# Patient Record
Sex: MALE | Race: WHITE | NOT HISPANIC OR LATINO | URBAN - METROPOLITAN AREA
[De-identification: names, ages, dates, MRNs, and addresses within clinical notes are randomized per-mention and may not be internally consistent; named-entity substitution may affect disease eponyms.]

---

## 2018-04-30 ENCOUNTER — AMBULATORY - HEALTHEAST (OUTPATIENT)
Dept: ADMINISTRATIVE | Facility: CLINIC | Age: 78
End: 2018-04-30

## 2018-04-30 ENCOUNTER — OFFICE VISIT - HEALTHEAST (OUTPATIENT)
Dept: GERIATRICS | Facility: CLINIC | Age: 78
End: 2018-04-30

## 2018-04-30 DIAGNOSIS — Z96.619 S/P REVERSE TOTAL SHOULDER ARTHROPLASTY: ICD-10-CM

## 2018-04-30 DIAGNOSIS — E03.9 HYPOTHYROIDISM: ICD-10-CM

## 2018-04-30 DIAGNOSIS — I10 HTN (HYPERTENSION): ICD-10-CM

## 2018-04-30 RX ORDER — ATENOLOL 25 MG/1
25 TABLET ORAL DAILY
Status: SHIPPED | COMMUNITY
Start: 2018-04-30

## 2018-04-30 RX ORDER — LEVOTHYROXINE SODIUM 150 UG/1
150 TABLET ORAL DAILY
Status: SHIPPED | COMMUNITY
Start: 2018-04-30

## 2018-04-30 RX ORDER — ATORVASTATIN CALCIUM 80 MG/1
80 TABLET, FILM COATED ORAL AT BEDTIME
Status: SHIPPED | COMMUNITY
Start: 2018-04-30

## 2018-04-30 RX ORDER — AMOXICILLIN 250 MG
1 CAPSULE ORAL DAILY
Status: SHIPPED | COMMUNITY
Start: 2018-04-30

## 2018-05-02 ENCOUNTER — AMBULATORY - HEALTHEAST (OUTPATIENT)
Dept: GERIATRICS | Facility: CLINIC | Age: 78
End: 2018-05-02

## 2018-11-07 ENCOUNTER — OFFICE VISIT - HEALTHEAST (OUTPATIENT)
Dept: GERIATRICS | Facility: CLINIC | Age: 78
End: 2018-11-07

## 2018-11-07 DIAGNOSIS — I10 ESSENTIAL HYPERTENSION: ICD-10-CM

## 2018-11-07 DIAGNOSIS — R29.6 FREQUENT FALLS: ICD-10-CM

## 2018-11-07 DIAGNOSIS — R52 PAIN MANAGEMENT: ICD-10-CM

## 2018-11-07 DIAGNOSIS — R53.81 PHYSICAL DECONDITIONING: ICD-10-CM

## 2018-11-07 DIAGNOSIS — C61 PROSTATE CANCER METASTATIC TO BONE (H): ICD-10-CM

## 2018-11-07 DIAGNOSIS — M17.0 OSTEOARTHRITIS OF BOTH KNEES, UNSPECIFIED OSTEOARTHRITIS TYPE: ICD-10-CM

## 2018-11-07 DIAGNOSIS — T07.XXXA ABRASIONS OF MULTIPLE SITES: ICD-10-CM

## 2018-11-07 DIAGNOSIS — C79.51 PROSTATE CANCER METASTATIC TO BONE (H): ICD-10-CM

## 2018-11-07 DIAGNOSIS — M19.012 OSTEOARTHRITIS OF LEFT SHOULDER, UNSPECIFIED OSTEOARTHRITIS TYPE: ICD-10-CM

## 2018-11-08 ENCOUNTER — AMBULATORY - HEALTHEAST (OUTPATIENT)
Dept: ADMINISTRATIVE | Facility: CLINIC | Age: 78
End: 2018-11-08

## 2018-11-08 RX ORDER — ACETAMINOPHEN 500 MG
1000 TABLET ORAL 3 TIMES DAILY
Status: SHIPPED | COMMUNITY
Start: 2018-11-08

## 2018-11-08 RX ORDER — BICALUTAMIDE 50 MG/1
50 TABLET, FILM COATED ORAL DAILY
Status: SHIPPED | COMMUNITY
Start: 2018-11-08

## 2018-11-08 RX ORDER — LOSARTAN POTASSIUM 25 MG/1
25 TABLET ORAL DAILY
Status: SHIPPED | COMMUNITY
Start: 2018-11-08

## 2018-11-09 ENCOUNTER — RECORDS - HEALTHEAST (OUTPATIENT)
Dept: LAB | Facility: CLINIC | Age: 78
End: 2018-11-09

## 2018-11-09 ENCOUNTER — OFFICE VISIT - HEALTHEAST (OUTPATIENT)
Dept: GERIATRICS | Facility: CLINIC | Age: 78
End: 2018-11-09

## 2018-11-09 DIAGNOSIS — T07.XXXA ABRASIONS OF MULTIPLE SITES: ICD-10-CM

## 2018-11-09 DIAGNOSIS — C79.51 PROSTATE CANCER METASTATIC TO BONE (H): ICD-10-CM

## 2018-11-09 DIAGNOSIS — R53.81 PHYSICAL DECONDITIONING: ICD-10-CM

## 2018-11-09 DIAGNOSIS — I10 ESSENTIAL HYPERTENSION: ICD-10-CM

## 2018-11-09 DIAGNOSIS — C61 PROSTATE CANCER METASTATIC TO BONE (H): ICD-10-CM

## 2018-11-09 DIAGNOSIS — R29.6 FREQUENT FALLS: ICD-10-CM

## 2018-11-10 ENCOUNTER — OFFICE VISIT - HEALTHEAST (OUTPATIENT)
Dept: GERIATRICS | Facility: CLINIC | Age: 78
End: 2018-11-10

## 2018-11-10 DIAGNOSIS — C79.51 PROSTATE CANCER METASTATIC TO BONE (H): ICD-10-CM

## 2018-11-10 DIAGNOSIS — T07.XXXA ABRASIONS OF MULTIPLE SITES: ICD-10-CM

## 2018-11-10 DIAGNOSIS — R29.6 FREQUENT FALLS: ICD-10-CM

## 2018-11-10 DIAGNOSIS — C61 PROSTATE CANCER METASTATIC TO BONE (H): ICD-10-CM

## 2018-11-10 DIAGNOSIS — R53.81 PHYSICAL DECONDITIONING: ICD-10-CM

## 2018-11-12 ENCOUNTER — OFFICE VISIT - HEALTHEAST (OUTPATIENT)
Dept: GERIATRICS | Facility: CLINIC | Age: 78
End: 2018-11-12

## 2018-11-12 DIAGNOSIS — C61 PROSTATE CANCER METASTATIC TO BONE (H): ICD-10-CM

## 2018-11-12 DIAGNOSIS — I10 ESSENTIAL HYPERTENSION: ICD-10-CM

## 2018-11-12 DIAGNOSIS — C79.51 PROSTATE CANCER METASTATIC TO BONE (H): ICD-10-CM

## 2018-11-12 DIAGNOSIS — T07.XXXA ABRASIONS OF MULTIPLE SITES: ICD-10-CM

## 2018-11-12 DIAGNOSIS — R53.81 PHYSICAL DECONDITIONING: ICD-10-CM

## 2018-11-12 LAB
ANION GAP SERPL CALCULATED.3IONS-SCNC: 8 MMOL/L (ref 5–18)
BUN SERPL-MCNC: 20 MG/DL (ref 8–28)
CALCIUM SERPL-MCNC: 9.4 MG/DL (ref 8.5–10.5)
CHLORIDE BLD-SCNC: 100 MMOL/L (ref 98–107)
CK SERPL-CCNC: 110 U/L (ref 30–190)
CO2 SERPL-SCNC: 30 MMOL/L (ref 22–31)
CREAT SERPL-MCNC: 0.88 MG/DL (ref 0.7–1.3)
ERYTHROCYTE [DISTWIDTH] IN BLOOD BY AUTOMATED COUNT: 13 % (ref 11–14.5)
GFR SERPL CREATININE-BSD FRML MDRD: >60 ML/MIN/1.73M2
GLUCOSE BLD-MCNC: 94 MG/DL (ref 70–125)
HCT VFR BLD AUTO: 33.6 % (ref 40–54)
HGB BLD-MCNC: 10.5 G/DL (ref 14–18)
MCH RBC QN AUTO: 28.8 PG (ref 27–34)
MCHC RBC AUTO-ENTMCNC: 31.3 G/DL (ref 32–36)
MCV RBC AUTO: 92 FL (ref 80–100)
PLATELET # BLD AUTO: 295 THOU/UL (ref 140–440)
PMV BLD AUTO: 10 FL (ref 8.5–12.5)
POTASSIUM BLD-SCNC: 3 MMOL/L (ref 3.5–5)
RBC # BLD AUTO: 3.64 MILL/UL (ref 4.4–6.2)
SODIUM SERPL-SCNC: 138 MMOL/L (ref 136–145)
WBC: 9.1 THOU/UL (ref 4–11)

## 2018-11-14 ENCOUNTER — OFFICE VISIT - HEALTHEAST (OUTPATIENT)
Dept: GERIATRICS | Facility: CLINIC | Age: 78
End: 2018-11-14

## 2018-11-14 DIAGNOSIS — R29.6 FREQUENT FALLS: ICD-10-CM

## 2018-11-14 DIAGNOSIS — M19.012 OSTEOARTHRITIS OF LEFT SHOULDER, UNSPECIFIED OSTEOARTHRITIS TYPE: ICD-10-CM

## 2018-11-14 DIAGNOSIS — C79.51 PROSTATE CANCER METASTATIC TO BONE (H): ICD-10-CM

## 2018-11-14 DIAGNOSIS — R52 PAIN MANAGEMENT: ICD-10-CM

## 2018-11-14 DIAGNOSIS — M17.0 OSTEOARTHRITIS OF BOTH KNEES, UNSPECIFIED OSTEOARTHRITIS TYPE: ICD-10-CM

## 2018-11-14 DIAGNOSIS — R53.81 PHYSICAL DECONDITIONING: ICD-10-CM

## 2018-11-14 DIAGNOSIS — I10 ESSENTIAL HYPERTENSION: ICD-10-CM

## 2018-11-14 DIAGNOSIS — C61 PROSTATE CANCER METASTATIC TO BONE (H): ICD-10-CM

## 2018-11-14 DIAGNOSIS — K59.00 CONSTIPATION, UNSPECIFIED CONSTIPATION TYPE: ICD-10-CM

## 2018-11-14 DIAGNOSIS — G47.00 INSOMNIA: ICD-10-CM

## 2018-11-14 DIAGNOSIS — T07.XXXA ABRASIONS OF MULTIPLE SITES: ICD-10-CM

## 2018-11-15 ENCOUNTER — RECORDS - HEALTHEAST (OUTPATIENT)
Dept: LAB | Facility: CLINIC | Age: 78
End: 2018-11-15

## 2018-11-16 ENCOUNTER — OFFICE VISIT - HEALTHEAST (OUTPATIENT)
Dept: GERIATRICS | Facility: CLINIC | Age: 78
End: 2018-11-16

## 2018-11-16 DIAGNOSIS — R53.81 PHYSICAL DECONDITIONING: ICD-10-CM

## 2018-11-16 DIAGNOSIS — C61 PROSTATE CANCER METASTATIC TO BONE (H): ICD-10-CM

## 2018-11-16 DIAGNOSIS — T07.XXXA ABRASIONS OF MULTIPLE SITES: ICD-10-CM

## 2018-11-16 DIAGNOSIS — M17.0 OSTEOARTHRITIS OF BOTH KNEES, UNSPECIFIED OSTEOARTHRITIS TYPE: ICD-10-CM

## 2018-11-16 DIAGNOSIS — C79.51 PROSTATE CANCER METASTATIC TO BONE (H): ICD-10-CM

## 2018-11-16 LAB — POTASSIUM BLD-SCNC: 3.9 MMOL/L (ref 3.5–5)

## 2018-11-19 ENCOUNTER — OFFICE VISIT - HEALTHEAST (OUTPATIENT)
Dept: GERIATRICS | Facility: CLINIC | Age: 78
End: 2018-11-19

## 2018-11-19 DIAGNOSIS — I10 ESSENTIAL HYPERTENSION: ICD-10-CM

## 2018-11-19 DIAGNOSIS — T07.XXXA ABRASIONS OF MULTIPLE SITES: ICD-10-CM

## 2018-11-19 DIAGNOSIS — R53.81 PHYSICAL DECONDITIONING: ICD-10-CM

## 2018-11-19 DIAGNOSIS — R29.6 FREQUENT FALLS: ICD-10-CM

## 2018-11-21 ENCOUNTER — OFFICE VISIT - HEALTHEAST (OUTPATIENT)
Dept: GERIATRICS | Facility: CLINIC | Age: 78
End: 2018-11-21

## 2018-11-21 DIAGNOSIS — K59.00 CONSTIPATION, UNSPECIFIED CONSTIPATION TYPE: ICD-10-CM

## 2018-11-21 DIAGNOSIS — G47.00 INSOMNIA: ICD-10-CM

## 2018-11-21 DIAGNOSIS — I10 ESSENTIAL HYPERTENSION: ICD-10-CM

## 2018-11-21 DIAGNOSIS — R53.81 PHYSICAL DECONDITIONING: ICD-10-CM

## 2018-11-21 DIAGNOSIS — C79.51 PROSTATE CANCER METASTATIC TO BONE (H): ICD-10-CM

## 2018-11-21 DIAGNOSIS — M17.0 OSTEOARTHRITIS OF BOTH KNEES, UNSPECIFIED OSTEOARTHRITIS TYPE: ICD-10-CM

## 2018-11-21 DIAGNOSIS — T07.XXXA ABRASIONS OF MULTIPLE SITES: ICD-10-CM

## 2018-11-21 DIAGNOSIS — C61 PROSTATE CANCER METASTATIC TO BONE (H): ICD-10-CM

## 2018-11-21 DIAGNOSIS — M19.012 OSTEOARTHRITIS OF LEFT SHOULDER, UNSPECIFIED OSTEOARTHRITIS TYPE: ICD-10-CM

## 2018-11-23 ENCOUNTER — OFFICE VISIT - HEALTHEAST (OUTPATIENT)
Dept: GERIATRICS | Facility: CLINIC | Age: 78
End: 2018-11-23

## 2018-11-23 DIAGNOSIS — G93.40 ENCEPHALOPATHY: ICD-10-CM

## 2018-11-23 DIAGNOSIS — I10 ESSENTIAL HYPERTENSION: ICD-10-CM

## 2018-11-23 DIAGNOSIS — R53.81 PHYSICAL DECONDITIONING: ICD-10-CM

## 2018-12-07 ENCOUNTER — OFFICE VISIT - HEALTHEAST (OUTPATIENT)
Dept: GERIATRICS | Facility: CLINIC | Age: 78
End: 2018-12-07

## 2018-12-07 ENCOUNTER — AMBULATORY - HEALTHEAST (OUTPATIENT)
Dept: ADMINISTRATIVE | Facility: CLINIC | Age: 78
End: 2018-12-07

## 2018-12-07 DIAGNOSIS — M17.0 OSTEOARTHRITIS OF BOTH KNEES, UNSPECIFIED OSTEOARTHRITIS TYPE: ICD-10-CM

## 2018-12-07 DIAGNOSIS — I10 ESSENTIAL HYPERTENSION: ICD-10-CM

## 2018-12-07 DIAGNOSIS — C61 PROSTATE CANCER METASTATIC TO BONE (H): ICD-10-CM

## 2018-12-07 DIAGNOSIS — C79.51 PROSTATE CANCER METASTATIC TO BONE (H): ICD-10-CM

## 2018-12-07 DIAGNOSIS — T07.XXXA ABRASIONS OF MULTIPLE SITES: ICD-10-CM

## 2018-12-07 RX ORDER — LANSOPRAZOLE 30 MG/1
30 CAPSULE, DELAYED RELEASE ORAL 2 TIMES DAILY
Status: SHIPPED | COMMUNITY
Start: 2018-12-07

## 2018-12-07 RX ORDER — OXYCODONE HYDROCHLORIDE 5 MG/1
5 TABLET ORAL EVERY 8 HOURS PRN
Status: SHIPPED | COMMUNITY
Start: 2018-12-07

## 2018-12-10 ENCOUNTER — OFFICE VISIT - HEALTHEAST (OUTPATIENT)
Dept: GERIATRICS | Facility: CLINIC | Age: 78
End: 2018-12-10

## 2018-12-10 DIAGNOSIS — M17.0 OSTEOARTHRITIS OF BOTH KNEES, UNSPECIFIED OSTEOARTHRITIS TYPE: ICD-10-CM

## 2018-12-10 DIAGNOSIS — C61 PROSTATE CANCER METASTATIC TO BONE (H): ICD-10-CM

## 2018-12-10 DIAGNOSIS — C79.51 PROSTATE CANCER METASTATIC TO BONE (H): ICD-10-CM

## 2018-12-10 DIAGNOSIS — G93.40 ENCEPHALOPATHY: ICD-10-CM

## 2018-12-14 ENCOUNTER — OFFICE VISIT - HEALTHEAST (OUTPATIENT)
Dept: GERIATRICS | Facility: CLINIC | Age: 78
End: 2018-12-14

## 2018-12-14 DIAGNOSIS — M17.0 OSTEOARTHRITIS OF BOTH KNEES, UNSPECIFIED OSTEOARTHRITIS TYPE: ICD-10-CM

## 2018-12-14 DIAGNOSIS — C61 PROSTATE CANCER METASTATIC TO BONE (H): ICD-10-CM

## 2018-12-14 DIAGNOSIS — C79.51 PROSTATE CANCER METASTATIC TO BONE (H): ICD-10-CM

## 2018-12-14 DIAGNOSIS — G93.40 ENCEPHALOPATHY: ICD-10-CM

## 2018-12-17 ENCOUNTER — OFFICE VISIT - HEALTHEAST (OUTPATIENT)
Dept: GERIATRICS | Facility: CLINIC | Age: 78
End: 2018-12-17

## 2018-12-17 DIAGNOSIS — C79.51 PROSTATE CANCER METASTATIC TO BONE (H): ICD-10-CM

## 2018-12-17 DIAGNOSIS — I10 ESSENTIAL HYPERTENSION: ICD-10-CM

## 2018-12-17 DIAGNOSIS — C61 PROSTATE CANCER METASTATIC TO BONE (H): ICD-10-CM

## 2018-12-17 DIAGNOSIS — M17.0 OSTEOARTHRITIS OF BOTH KNEES, UNSPECIFIED OSTEOARTHRITIS TYPE: ICD-10-CM

## 2018-12-19 ENCOUNTER — OFFICE VISIT - HEALTHEAST (OUTPATIENT)
Dept: GERIATRICS | Facility: CLINIC | Age: 78
End: 2018-12-19

## 2018-12-19 DIAGNOSIS — K59.03 DRUG-INDUCED CONSTIPATION: ICD-10-CM

## 2018-12-19 DIAGNOSIS — R10.84 GENERALIZED ABDOMINAL PAIN: ICD-10-CM

## 2018-12-21 ENCOUNTER — OFFICE VISIT - HEALTHEAST (OUTPATIENT)
Dept: GERIATRICS | Facility: CLINIC | Age: 78
End: 2018-12-21

## 2018-12-21 DIAGNOSIS — I10 ESSENTIAL HYPERTENSION: ICD-10-CM

## 2018-12-21 DIAGNOSIS — R53.81 PHYSICAL DECONDITIONING: ICD-10-CM

## 2018-12-21 DIAGNOSIS — C61 PROSTATE CANCER METASTATIC TO BONE (H): ICD-10-CM

## 2018-12-21 DIAGNOSIS — C79.51 PROSTATE CANCER METASTATIC TO BONE (H): ICD-10-CM

## 2018-12-24 ENCOUNTER — OFFICE VISIT - HEALTHEAST (OUTPATIENT)
Dept: GERIATRICS | Facility: CLINIC | Age: 78
End: 2018-12-24

## 2018-12-24 DIAGNOSIS — R53.81 PHYSICAL DECONDITIONING: ICD-10-CM

## 2018-12-24 DIAGNOSIS — I10 ESSENTIAL HYPERTENSION: ICD-10-CM

## 2018-12-24 DIAGNOSIS — C79.51 PROSTATE CANCER METASTATIC TO BONE (H): ICD-10-CM

## 2018-12-24 DIAGNOSIS — C61 PROSTATE CANCER METASTATIC TO BONE (H): ICD-10-CM

## 2018-12-26 ENCOUNTER — OFFICE VISIT - HEALTHEAST (OUTPATIENT)
Dept: GERIATRICS | Facility: CLINIC | Age: 78
End: 2018-12-26

## 2018-12-26 DIAGNOSIS — R53.81 PHYSICAL DECONDITIONING: ICD-10-CM

## 2018-12-26 DIAGNOSIS — I10 HYPERTENSION, UNSPECIFIED TYPE: ICD-10-CM

## 2018-12-26 DIAGNOSIS — C80.1: ICD-10-CM

## 2018-12-26 DIAGNOSIS — K59.03 DRUG-INDUCED CONSTIPATION: ICD-10-CM

## 2018-12-26 DIAGNOSIS — C79.31: ICD-10-CM

## 2018-12-28 ENCOUNTER — OFFICE VISIT - HEALTHEAST (OUTPATIENT)
Dept: GERIATRICS | Facility: CLINIC | Age: 78
End: 2018-12-28

## 2018-12-28 DIAGNOSIS — C79.31: ICD-10-CM

## 2018-12-28 DIAGNOSIS — C79.51 PROSTATE CANCER METASTATIC TO BONE (H): ICD-10-CM

## 2018-12-28 DIAGNOSIS — I10 HYPERTENSION, UNSPECIFIED TYPE: ICD-10-CM

## 2018-12-28 DIAGNOSIS — C61 PROSTATE CANCER METASTATIC TO BONE (H): ICD-10-CM

## 2018-12-28 DIAGNOSIS — C80.1: ICD-10-CM

## 2018-12-31 ENCOUNTER — RECORDS - HEALTHEAST (OUTPATIENT)
Dept: LAB | Facility: CLINIC | Age: 78
End: 2018-12-31

## 2018-12-31 ENCOUNTER — OFFICE VISIT - HEALTHEAST (OUTPATIENT)
Dept: GERIATRICS | Facility: CLINIC | Age: 78
End: 2018-12-31

## 2018-12-31 DIAGNOSIS — C79.51 PROSTATE CANCER METASTATIC TO BONE (H): ICD-10-CM

## 2018-12-31 DIAGNOSIS — C79.31: ICD-10-CM

## 2018-12-31 DIAGNOSIS — I10 HYPERTENSION, UNSPECIFIED TYPE: ICD-10-CM

## 2018-12-31 DIAGNOSIS — C61 PROSTATE CANCER METASTATIC TO BONE (H): ICD-10-CM

## 2018-12-31 DIAGNOSIS — C80.1: ICD-10-CM

## 2019-01-01 LAB — BACTERIA SPEC CULT: NORMAL

## 2019-01-04 ENCOUNTER — OFFICE VISIT - HEALTHEAST (OUTPATIENT)
Dept: GERIATRICS | Facility: CLINIC | Age: 79
End: 2019-01-04

## 2019-01-04 DIAGNOSIS — R53.81 PHYSICAL DECONDITIONING: ICD-10-CM

## 2019-01-04 DIAGNOSIS — C79.31: ICD-10-CM

## 2019-01-04 DIAGNOSIS — I10 HYPERTENSION, UNSPECIFIED TYPE: ICD-10-CM

## 2019-01-04 DIAGNOSIS — C80.1: ICD-10-CM

## 2019-01-07 ENCOUNTER — OFFICE VISIT - HEALTHEAST (OUTPATIENT)
Dept: GERIATRICS | Facility: CLINIC | Age: 79
End: 2019-01-07

## 2019-01-07 DIAGNOSIS — C61 PROSTATE CANCER METASTATIC TO BONE (H): ICD-10-CM

## 2019-01-07 DIAGNOSIS — I10 ESSENTIAL HYPERTENSION: ICD-10-CM

## 2019-01-07 DIAGNOSIS — R53.81 PHYSICAL DECONDITIONING: ICD-10-CM

## 2019-01-07 DIAGNOSIS — C79.51 PROSTATE CANCER METASTATIC TO BONE (H): ICD-10-CM

## 2019-01-11 ENCOUNTER — OFFICE VISIT - HEALTHEAST (OUTPATIENT)
Dept: GERIATRICS | Facility: CLINIC | Age: 79
End: 2019-01-11

## 2019-01-11 DIAGNOSIS — I10 HYPERTENSION, UNSPECIFIED TYPE: ICD-10-CM

## 2019-01-11 DIAGNOSIS — K59.00 CONSTIPATION, UNSPECIFIED CONSTIPATION TYPE: ICD-10-CM

## 2019-01-11 DIAGNOSIS — C80.1: ICD-10-CM

## 2019-01-11 DIAGNOSIS — C79.31: ICD-10-CM

## 2019-01-14 ENCOUNTER — OFFICE VISIT - HEALTHEAST (OUTPATIENT)
Dept: GERIATRICS | Facility: CLINIC | Age: 79
End: 2019-01-14

## 2019-01-14 DIAGNOSIS — I10 HYPERTENSION, UNSPECIFIED TYPE: ICD-10-CM

## 2019-01-14 DIAGNOSIS — R53.81 PHYSICAL DECONDITIONING: ICD-10-CM

## 2019-01-14 DIAGNOSIS — C79.51 PROSTATE CANCER METASTATIC TO BONE (H): ICD-10-CM

## 2019-01-14 DIAGNOSIS — C61 PROSTATE CANCER METASTATIC TO BONE (H): ICD-10-CM

## 2019-01-18 ENCOUNTER — OFFICE VISIT - HEALTHEAST (OUTPATIENT)
Dept: GERIATRICS | Facility: CLINIC | Age: 79
End: 2019-01-18

## 2019-01-18 DIAGNOSIS — C61 PROSTATE CANCER METASTATIC TO BONE (H): ICD-10-CM

## 2019-01-18 DIAGNOSIS — C79.51 PROSTATE CANCER METASTATIC TO BONE (H): ICD-10-CM

## 2019-01-18 DIAGNOSIS — C79.31: ICD-10-CM

## 2019-01-18 DIAGNOSIS — C80.1: ICD-10-CM

## 2019-01-18 DIAGNOSIS — I10 HYPERTENSION, UNSPECIFIED TYPE: ICD-10-CM

## 2019-01-18 DIAGNOSIS — F03.90 DEMENTIA WITHOUT BEHAVIORAL DISTURBANCE, UNSPECIFIED DEMENTIA TYPE: ICD-10-CM

## 2019-01-21 ENCOUNTER — OFFICE VISIT - HEALTHEAST (OUTPATIENT)
Dept: GERIATRICS | Facility: CLINIC | Age: 79
End: 2019-01-21

## 2019-01-21 DIAGNOSIS — C80.1: ICD-10-CM

## 2019-01-21 DIAGNOSIS — C61 PROSTATE CANCER METASTATIC TO BONE (H): ICD-10-CM

## 2019-01-21 DIAGNOSIS — C79.51 PROSTATE CANCER METASTATIC TO BONE (H): ICD-10-CM

## 2019-01-21 DIAGNOSIS — I10 HYPERTENSION, UNSPECIFIED TYPE: ICD-10-CM

## 2019-01-21 DIAGNOSIS — C79.31: ICD-10-CM

## 2019-01-25 ENCOUNTER — OFFICE VISIT - HEALTHEAST (OUTPATIENT)
Dept: GERIATRICS | Facility: CLINIC | Age: 79
End: 2019-01-25

## 2019-01-25 DIAGNOSIS — K64.9 HEMORRHOIDS, UNSPECIFIED HEMORRHOID TYPE: ICD-10-CM

## 2019-01-28 ENCOUNTER — OFFICE VISIT - HEALTHEAST (OUTPATIENT)
Dept: GERIATRICS | Facility: CLINIC | Age: 79
End: 2019-01-28

## 2019-01-28 DIAGNOSIS — F03.90 DEMENTIA WITHOUT BEHAVIORAL DISTURBANCE, UNSPECIFIED DEMENTIA TYPE: ICD-10-CM

## 2019-01-28 DIAGNOSIS — K59.03 DRUG-INDUCED CONSTIPATION: ICD-10-CM

## 2019-01-28 DIAGNOSIS — C61 PROSTATE CANCER METASTATIC TO BONE (H): ICD-10-CM

## 2019-01-28 DIAGNOSIS — I10 ESSENTIAL HYPERTENSION: ICD-10-CM

## 2019-01-28 DIAGNOSIS — C79.51 PROSTATE CANCER METASTATIC TO BONE (H): ICD-10-CM

## 2019-02-04 ENCOUNTER — OFFICE VISIT - HEALTHEAST (OUTPATIENT)
Dept: GERIATRICS | Facility: CLINIC | Age: 79
End: 2019-02-04

## 2019-02-04 DIAGNOSIS — H81.03: ICD-10-CM

## 2019-02-15 ENCOUNTER — OFFICE VISIT - HEALTHEAST (OUTPATIENT)
Dept: GERIATRICS | Facility: CLINIC | Age: 79
End: 2019-02-15

## 2019-02-15 DIAGNOSIS — K59.03 DRUG-INDUCED CONSTIPATION: ICD-10-CM

## 2019-02-15 DIAGNOSIS — E78.5 HYPERLIPIDEMIA, UNSPECIFIED HYPERLIPIDEMIA TYPE: ICD-10-CM

## 2019-02-18 ENCOUNTER — RECORDS - HEALTHEAST (OUTPATIENT)
Dept: LAB | Facility: CLINIC | Age: 79
End: 2019-02-18

## 2019-02-18 LAB
ANION GAP SERPL CALCULATED.3IONS-SCNC: 10 MMOL/L (ref 5–18)
BASOPHILS # BLD AUTO: 0 THOU/UL (ref 0–0.2)
BASOPHILS NFR BLD AUTO: 0 % (ref 0–2)
BUN SERPL-MCNC: 26 MG/DL (ref 8–28)
CALCIUM SERPL-MCNC: 9.7 MG/DL (ref 8.5–10.5)
CHLORIDE BLD-SCNC: 108 MMOL/L (ref 98–107)
CO2 SERPL-SCNC: 23 MMOL/L (ref 22–31)
CREAT SERPL-MCNC: 0.94 MG/DL (ref 0.7–1.3)
EOSINOPHIL # BLD AUTO: 0.1 THOU/UL (ref 0–0.4)
EOSINOPHIL NFR BLD AUTO: 2 % (ref 0–6)
ERYTHROCYTE [DISTWIDTH] IN BLOOD BY AUTOMATED COUNT: 15.7 % (ref 11–14.5)
GFR SERPL CREATININE-BSD FRML MDRD: >60 ML/MIN/1.73M2
GLUCOSE BLD-MCNC: 89 MG/DL (ref 70–125)
HCT VFR BLD AUTO: 32.3 % (ref 40–54)
HGB BLD-MCNC: 9.9 G/DL (ref 14–18)
LYMPHOCYTES # BLD AUTO: 1 THOU/UL (ref 0.8–4.4)
LYMPHOCYTES NFR BLD AUTO: 20 % (ref 20–40)
MCH RBC QN AUTO: 28.4 PG (ref 27–34)
MCHC RBC AUTO-ENTMCNC: 30.7 G/DL (ref 32–36)
MCV RBC AUTO: 93 FL (ref 80–100)
MONOCYTES # BLD AUTO: 0.5 THOU/UL (ref 0–0.9)
MONOCYTES NFR BLD AUTO: 10 % (ref 2–10)
NEUTROPHILS # BLD AUTO: 3.4 THOU/UL (ref 2–7.7)
NEUTROPHILS NFR BLD AUTO: 68 % (ref 50–70)
PLATELET # BLD AUTO: 162 THOU/UL (ref 140–440)
PMV BLD AUTO: 11 FL (ref 8.5–12.5)
POTASSIUM BLD-SCNC: 3.6 MMOL/L (ref 3.5–5)
RBC # BLD AUTO: 3.49 MILL/UL (ref 4.4–6.2)
SODIUM SERPL-SCNC: 141 MMOL/L (ref 136–145)
TSH SERPL DL<=0.005 MIU/L-ACNC: 0.05 UIU/ML (ref 0.3–5)
WBC: 5.1 THOU/UL (ref 4–11)

## 2019-02-25 ENCOUNTER — OFFICE VISIT - HEALTHEAST (OUTPATIENT)
Dept: GERIATRICS | Facility: CLINIC | Age: 79
End: 2019-02-25

## 2019-02-25 DIAGNOSIS — R53.81 PHYSICAL DECONDITIONING: ICD-10-CM

## 2019-02-25 DIAGNOSIS — C80.1: ICD-10-CM

## 2019-02-25 DIAGNOSIS — I10 HYPERTENSION, UNSPECIFIED TYPE: ICD-10-CM

## 2019-02-25 DIAGNOSIS — K59.03 DRUG-INDUCED CONSTIPATION: ICD-10-CM

## 2019-02-25 DIAGNOSIS — E78.5 HYPERLIPIDEMIA, UNSPECIFIED HYPERLIPIDEMIA TYPE: ICD-10-CM

## 2019-02-25 DIAGNOSIS — C79.31: ICD-10-CM

## 2019-02-25 RX ORDER — SILVER SULFADIAZINE 10 MG/G
1 CREAM TOPICAL DAILY
Status: SHIPPED | COMMUNITY
Start: 2019-02-25

## 2019-02-25 RX ORDER — HYDROCHLOROTHIAZIDE 12.5 MG/1
12.5 TABLET ORAL DAILY
Status: SHIPPED | COMMUNITY
Start: 2019-02-25

## 2019-02-28 ENCOUNTER — AMBULATORY - HEALTHEAST (OUTPATIENT)
Dept: GERIATRICS | Facility: CLINIC | Age: 79
End: 2019-02-28

## 2021-06-02 VITALS — WEIGHT: 177.9 LBS

## 2021-06-02 VITALS — WEIGHT: 206 LBS

## 2021-06-02 VITALS — WEIGHT: 182 LBS

## 2021-06-02 VITALS — WEIGHT: 184.4 LBS

## 2021-06-16 PROBLEM — K64.9 HEMORRHOIDS: Status: ACTIVE | Noted: 2019-01-28

## 2021-06-16 PROBLEM — E78.5 HLD (HYPERLIPIDEMIA): Status: ACTIVE | Noted: 2019-02-18

## 2021-06-16 PROBLEM — I10 HTN (HYPERTENSION): Status: ACTIVE | Noted: 2018-12-28

## 2021-06-16 PROBLEM — C79.31: Status: ACTIVE | Noted: 2018-12-28

## 2021-06-16 PROBLEM — K59.00 CONSTIPATION: Status: ACTIVE | Noted: 2018-12-23

## 2021-06-16 PROBLEM — C80.1: Status: ACTIVE | Noted: 2018-12-28

## 2021-06-16 PROBLEM — R53.81 PHYSICAL DECONDITIONING: Status: ACTIVE | Noted: 2018-12-28

## 2021-06-16 PROBLEM — H81.09: Status: ACTIVE | Noted: 2019-02-05

## 2021-06-17 NOTE — PROGRESS NOTES
[facility]    Facility:   [facility] [[id number]]   Code Status: POLST AVAILABLE    Admission evaluation to TCU of 77-year-old male who underwent reverse right total shoulder arthroplasty for right rotator cuff tear with gleno humeral joint abnormality, chronic pain and limited range of motion, no postoperative complications, received IV antibiotic postoperative, stabilized and transferred to TCU for rehabilitation, pain management, management of medical problems which include hypertension, history of prostate cancer, coronary artery disease.    Past medical history, social history, drug allergies, current medication list, code status reviewed in epic. Family history positive for coronary artery disease.    Review of systems: describes minimal pain. Desires however to receive hydrocodone acetaminophen 5 - 325 q 4  hours routinely, on discharge from hospital prescription was written for 1 to 2 tablets Q4 hours PRN pain. Patient believes he should receive Norco routinely as this was prescribed and discussed with him at time of hospital discharge, will be seeing orthopedics in follow-up tomorrow. Does not wish for PRN administration. Denies cardiac or pulmonary symptomatology. No active urinary symptoms. No fever sweats or chills. Appetite adequate. Anticipates being in TCU for several days and then returning to home setting . Chief concern is administration of medication, should medication be given PRN he wishes this to be discussed with his surgical team. Remainder of 12 point review of systems obtained negative.    Exam: patient alert and oriented, highly articulate, right arm in sling, in no apparent distress. Vital signs reviewed since admission to TCU. No facial asymmetry. Pharynx without erythema. No carotid bruits. Thyroid with fine nodularity, no dominant nodules, no enlargement or tenderness. S1 and S2 regular with faint systolic murmur. Pulmonary exam with  dry crackles left lung base, no wheezes rales or rhonchi. Abdomen without masses tenderness organomegaly. Periphery with trace nonpitting edema, trace venous stasis changes. Muscle tone and strength excellent and symmetrical. Right hand without edema, radial and ulnar pulses intact. Digital strength and sensation intact. Surgical site is dressed, no surrounding erythema or soft-tissue swelling.    Impression and plan:   status post reverse right TSA without evidence of complication. Norco scheduled one tablet Q4 hours at patient's request, no significant pain present. Aleeve 440mg bid scheduled at hospital discharge, clarification of order, change to to 20 b.i.d.   Hypertension on  atenolol 25 mg, HCTZ 25 mg, nifedipine 30 mg, satisfactory control of blood pressure.   History of coronary artery disease without indication of angina.   Hyperlipidemia on Lipitor 40 mg Q day.   Hypothyroidism on Synthroid 150 MCG Q day.   Care plan and medications are reviewed, outside medical records are reviewed, concerns regarding administration of medication reviewed. Anticipate stay in TCU will be short. Total time of admission evaluation greater than 45 minutes, greater than 50% of time spent in coordination of care and counseling.      Electronically signed by: Lorenza Sanchez MD

## 2021-06-18 NOTE — LETTER
Letter by Lorenza Sanchez MD at      Author: Lorenza Sanchez MD Service: -- Author Type: --    Filed:  Encounter Date: 12/28/2018 Status: (Other)         Patient: Maximo Rick   MR Number: [id number]   YOB: 1940   Date of Visit: 12/28/2018     [facility]    Facility:   [facility] [[id number]]   Code Status: POLST AVAILABLE      Reassessment of 78-year-old male initially admitted to hospital with rhabdomyolysis, had fallen on multiple occasions in home setting, unable to upright self from floor for several days, found by a friend, treated for rhabdomyolysis, post hospitalization for rehydration transferred to TCU for rehabilitation, readmitted to hospital with escalating confusion, found to have cerebellar mass and hydrocephalus, 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    Review of systems: chief complaint is of rectal discomfort, previously with constipation resolved, experiencing loose stools intermittently. Bowl movements caused extreme rectal discomfort. Believes stools are to loose. Unwilling to get out of bed. States he believes he should be in bed as he is tired and requires rest. Denies abdominal pain. No focal neurologic deficits. Aware of ongoing mild confusion, denies focal neurologic symptoms or seizure activity. Remainder of 12 point review of systems obtained negative.    Exam: vital signs reviewed over past four days. Lying supine in bed, cooperative, oriented times two, in no apparent distress. Tangential in discussion in presentation. No facial asymmetry. Mucous membranes moist. No nuchal rigidity. No hand drift or tremor. S1 and S2 regular. Pulmonary exam without rales rhonchi or  wheezes. Abdomen without distention, hyperactive bowl sounds. Periphery without edema. DJD changes knees and digits without acute inflammatory change. Strength and muscle tone diminished and symmetrical lower extremities.    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   Status post resection of cerebellar non-small cell cancer, thought to be variant of prostate cancer metastatic, hydrocephalus post drainage, cognition slightly improved, no acute encephalopathic symptoms present.   Highly symptomatic pain from anal fissure, begin lidocaine with nitroglycerin topical gel to anal region Q day, reviewed with pharmacy by nursing staff.   Previous constipation resolved, loose stooling, decrease senna S to two tablets Q day from four tablets Q day, continue Metamucil and MiraLAX. No evidence of acute abdomen.   Dementia, will require memory care placement.   Hypertension with adequate control of blood pressure.   Multiple concerns reviewed with patient and with nursing staff.    Electronically signed by: Lorenza Sanchez MD

## 2021-06-18 NOTE — LETTER
Letter by Lorenza Sanchez MD at      Author: Lorenza Sanchez MD Service: -- Author Type: --    Filed:  Encounter Date: 1/14/2019 Status: (Other)         Patient: Maximo Rick   MR Number: [id number]   YOB: 1940   Date of Visit: 1/14/2019     [facility]    Facility:   [facility] [[id number]]   Code Status: POLST AVAILABLE  Reassessment of 78-year-old male with metastatic prostate cancer, recently found down at home, admitted to hospital with rhabdomyolysis, transferred to TCU post stabilization, progressive encephalopathy, readmitted to hospital with findings of cerebellar mass, brain metastases, underwent cerebellar mass resection, variant of prostate cancer/non-small cell cancer, no other primary identified, transferred back to TCU for ongoing rehabilitation and management of medical problems. To begin whole brain radiation therapy over next seven days.    Review of systems: chief concern is regarding fungal toenails, has seen podiatrist in past, previous order made for podiatry appointment, patient desires to make his own appointment. Rubbing lesion left great toe minimally painful. Continued improvement in cognitive status. Aware of mild memory deficit. No headache or focal neurologic deficits. No seizure activity. In bed majority of time, weakness present, denies chest pain or palpitations. No active joint discomfort. No current urinary symptoms. Remainder of 12 point review of systems obtained negative.    Exam: vital signs reviewed over past 72 hours. Alert, mildly vague in presentation, oriented in entirety. Highly conversant. No facial asymmetry. Extraocular movements intact. No carotid bruits or HJR. S1 and S2 regular. Pulmonary exam without rales rhonchi or wheezes. Abdomen without masses tenderness organomegaly. Periphery with negligible edema, venous stasis changes present. DJD changes multiple joints, no acute inflammatory  change. No hand drift. Strength symmetrical upper extremities, strength minus one bilateral lower extremities.    Impression and plan:   metastatic prostate cancer, no active back pain, majority of metastases involving sacrum and lumbar spine, on chemotherapy per urology.   Recent cerebellar mass resection, hydrocephalus resolved, mild memory deficit, cognition overall improving, recent encephalopathy resolved, to begin radiation therapy whole brain in seven days, concerns regarding radiation therapy reviewed.   Hypertension, satisfactory control of blood pressure, intermittent low blood pressure recordings, no orthostatic symptoms, continue to observe.   Deconditioning, encourage patient to be up and out of bed on a regular basis.   Multiple concerns are reviewed, discussion with nursing staff.        Electronically signed by: Lorenza Sanchez MD

## 2021-06-18 NOTE — LETTER
Letter by Lorenza Sanchez MD at      Author: Lorenza Sanchez MD Service: -- Author Type: --    Filed:  Encounter Date: 1/11/2019 Status: (Other)         Patient: Maximo Rick   MR Number: [id number]   YOB: 1940   Date of Visit: 1/11/2019     [facility]    Facility:   [facility] [[id number]]   Code Status: POLST AVAILABLE    Reevaluation of 78-year-old male with hypertension, prostate cancer metastatic to bone, initial admission to TCU following rhabdomyolysis, had fallen at home and unable to upright self, readmitted to hospital with increasing encephalopathy, underwent resection of cerebellar mass and drain of hydrocephalus, continues in TCU for rehabilitation and management of medical problems.    Review of systems: will be given radiation therapy in one week. Aware of improvement in cognition. Concerned regarding potential side effects of radiation to brain. Denies back pain or knee discomfort. Generalized fatigue is present. No cardiac or pulmonary symptoms. Concerned regarding fungal toenails, Silvadene applied to left toe tip with pressure area. Requests appointment with personal podiatrist, order written. Previous rectal pain secondary to anal fissure resolving. No active constipation. Remainder of 12 point review of systems obtained negative.    Exam: alert, oriented, minimal evidence of memory deficit. Respiratory 18, blood pressure 115/71, pulse 60, 02 95% on room air. No facial asymmetry. Extraocular movements intact. No HJR. Mucous membranes moist. S1 and S2 regular. Pulmonary exam without rales rhonchi or wheezes. Abdomen without masses tenderness organomegaly. -1 strength bilateral feet, questionable early foot drop. No calf tenderness or swelling.    Impression and plan:   hypertension, adequate control of blood pressure, relatively low blood pressure recordings without orthostatic changes.   Mild cognitive impairment,  continued improvement in cognition.   Remaining brain lesions, to begin radiation therapy in one week. Reviewed potential short and long-term consequences of whole brain radiation.   Metastatic prostate cancer to bone, followed by urology, on hormonal management.   Recent encephalopathy resolving.   Profound weakness and deconditioning,   Limited activity in physical therapy, continue attempts at rehabilitation.   Anal fissure, pain gradually resolving. Multiple concerns are reviewed.      Electronically signed by: Lorenza Sanchez MD

## 2021-06-18 NOTE — LETTER
"Letter by Amberly Bynum CNP at      Author: Amberly Bynum CNP Service: -- Author Type: --    Filed:  Encounter Date: 2/15/2019 Status: (Other)         Patient: Maximo Rick   MR Number: [id number]   YOB: 1940   Date of Visit: 2/15/2019     [facility]      Facility:    [facility] [[id number]]  Code Status: DNR and POLST AVAILABLE      Chief Complaint/Reason for Visit:  Chief Complaint   Patient presents with   ? Problem Visit     medication management       HPI:   Maximo \"Gene\" Merline is a 78 y.o. male who presents for admission to [facility] following a hospitalization for a fall where he was found down at his house for approximately two days. According to admission records from the hospital and per patient, he fell last Wednesday Oct 31st, and was unable to get himself up. He did not hit his head when he fell.  He crawled along like an \"inchworm\" until a physical therapist came to check on him after he missed an appt. He was brought to the hospital via ambulance. His was hospitalized from 11/2/18 - 11/5/18. 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    Today, Avel is requesting evaluation for medication management regarding constipation and ASA use for HLD. Homero states he was always supposed to be on ASA for his HLD. He has had his cardiologist requesting this for some time. He has had a short history of GI bleed. Avel does understand there is some risk of bleed, however he does want to start taking ASA again. In addition, Avel states his bowels have been going well and he does not want to take any medications for constipation except for one senna tablet by mouth per day. He is otherwise feeling quite well. He denies fevers/chills, cough or cold symptoms, vision changes, chest pain/pressure, difficulty breathing, SOB, nausea, vomiting, diarrhea, dysuria, increasing weakness, increasing pain.     Past Medical History:  Past Medical History:   Diagnosis Date   ? Anemia    ? Arthritis     right shoulder region   ? Blockage of coronary artery of heart (H)    ? Calcific tendinitis     right shoulder   ? Cancer (H)     prostate   ? Chronic ischemic heart disease    ? Chronic pain     both shoulders   ? Chronic pain of both shoulders    ? Coronary atherosclerosis    ? Dermatochalasis    ? Gastric reflux    ? HLD (hyperlipidemia)    ? HTN (hypertension)    ? Hypothyroidism    ? IT band syndrome    ? Meniere's disease in remission    ? 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 Nuclear sclerosis    ? Obstructive hydrocephalus    ? Osteoarthritis     right and left glenohumeral joint   ? Primary osteoarthritis of both shoulders    ? Prostate cancer (H) 07/18/2017    metastatic to bone   ? Raynaud disease    ? Rhabdomyolysis    ? Rotator cuff tear     complete right shoulder   ? Senile cataract    ? Single vessel coronary artery disease    ? Thyroiditis    ? Vitreous degeneration     ? Vitreous opacities            Surgical History:  Past Surgical History:   Procedure Laterality Date   ? CYST REMOVAL  1983    foot    ? INGUINAL HERNIA REPAIR  07/30/2007   ? OTHER SURGICAL HISTORY  11/23/2018    Placement of a right frontal external ventricular drain   ? POSTERIOR FOSSA DECOMPRESSION  11/26/2018    craniectomy and resection of tumor   ? REVERSE TOTAL SHOULDER ARTHROPLASTY  04/25/2018   ? supraglottic-LMA  04/25/2018   ? TONSILLECTOMY         Family History:   Family History   Problem Relation Age of Onset   ? Hypertension Mother    ? Coronary artery disease Father    ? Hypertension Father    ? Coronary artery disease Brother    ? Hypertension Brother        Social History:    Social History     Socioeconomic History   ? Marital status: Single     Spouse name: None   ? Number of children: None   ? Years of education: None   ? Highest education level: None   Social Needs   ? Financial resource strain: None   ? Food insecurity - worry: None   ? Food insecurity - inability: None   ? Transportation needs - medical: None   ? Transportation needs - non-medical: None   Occupational History   ? None   Tobacco Use   ? Smoking status: Never Smoker   ? Smokeless tobacco: Never Used   Substance and Sexual Activity   ? Alcohol use: Yes     Alcohol/week: 4.2 - 8.4 oz     Types: 7 - 14 Standard drinks or equivalent per week   ? Drug use: No   ? Sexual activity: Not Currently   Other Topics Concern   ? None   Social History Narrative   ? None          Review of Systems   See HPI.     Vitals:    02/15/19 1840   BP: 124/62   Pulse: 92   Resp: 18   Temp: 97.8  F (36.6  C)   SpO2: 95%   Weight: 177 lb 14.4 oz (80.7 kg)       Physical Exam   General appearance: alert, appears stated age and cooperative  HEENT: Head is normocephalic with normal hair distribution. No evidence of trauma. Ears: Without lesions or deformity. No acute purulent discharge. Eyes: Conjunctivae pink with no scleral icterus or erythema. Nose:  Normal mucosa and septum. Oropharnyx: mmm, no lesions present.  Lungs: clear to auscultation bilaterally, respirations without effort  Heart: regular rate and rhythm, S1, S2 normal, no murmur, click, rub or gallop  Abdomen: soft, non-tender; bowel sounds normal; no masses,  no organomegaly  Extremities: extremities normal, atraumatic, no cyanosis or edema  Pulses: 2+ and symmetric  Skin: Skin color, texture, turgor normal. No rashes or lesions  Neurologic: Grossly normal   Psych: interacts well with caregivers, exhibits logical thought processes and connections, pleasant      Medication List:  Current Outpatient Medications   Medication Sig   ? acetaminophen (TYLENOL) 500 MG tablet Take 1,000 mg by mouth 3 (three) times a day (max 4gm acetaminophen in 24/hr).         ? atenolol (TENORMIN) 25 MG tablet Take 25 mg by mouth daily .         ? atorvastatin (LIPITOR) 80 MG tablet Take 80 mg by mouth at bedtime .         ? bicalutamide (CASODEX) 50 MG tablet Take 50 mg by mouth daily.   ? lansoprazole (PREVACID) 30 MG capsule Take 30 mg by mouth 2 (two) times a day.   ? levothyroxine (SYNTHROID, LEVOTHROID) 150 MCG tablet Take 150 mcg by mouth daily on empty stomach.         ? losartan (COZAAR) 25 MG tablet Take 25 mg by mouth daily , hold if SBP <100.         ? melatonin 5 mg Tab tablet Take 5 mg by mouth at bedtime for insomnia.         ? multivitamin therapeutic tablet Take 1 tablet by mouth daily.   ? NIFEdipine (PROCARDIA XL) 30 MG 24 hr tablet Take 30 mg by mouth daily . Take before meal..         ? oxyCODONE (ROXICODONE) 5 MG immediate release tablet Take 5 mg by mouth every 8 (eight) hours as needed for pain (pain level 8-10).   ? polyethylene glycol (MIRALAX) 17 gram packet Take 17 g by mouth daily as needed.   ? senna-docusate (PERICOLACE) 8.6-50 mg tablet Take 2 tablets by mouth 2 (two) times a day as needed.        Labs:  No labs today    Assessment:    ICD-10-CM    1. Drug-induced constipation K59.03    2.  Hyperlipidemia, unspecified hyperlipidemia type E78.5        Plan:  HLD  -ASA 81 mg by mouth daily.   -Nothing greater than this due to risk of gastric bleed.   -Atorvastatin 80 mg by mouth daily.   -Follow up as needed.     Constipation  -Hold all bowel meds, except for senna.   -Senna 1 tab by mouth daily.   -Encouraged patient to increase fiber in diet, eat a lot of fruits and vegetables, increase water intake.  -Exercise as much as possible.   -Follow up as needed.     Otherwise continue current care plan for all other chronic medical conditions, as they are stable. Encouraged patient to engage in healthy lifestyle behaviors such as engaging in social activities, exercising (PT/OT), eating well, and following care plan. Follow up for routine check-up, or sooner if needed. Will continue to monitor patient and work with nursing staff collaboratively to work toward positive patient outcomes.    Electronically signed by: Amberly Bynum CNP

## 2021-06-18 NOTE — LETTER
"Letter by Amberly Bynum CNP at      Author: Amberly Bynum CNP Service: -- Author Type: --    Filed:  Encounter Date: 2/4/2019 Status: (Other)         Patient: Maximo Rick   MR Number: [id number]   YOB: 1940   Date of Visit: 2/4/2019     [facility]      Facility:    [facility] [[id number]]  Code Status: DNR and POLST AVAILABLE      Chief Complaint/Reason for Visit:  Chief Complaint   Patient presents with   ? Problem Visit     Meniere's Disease       HPI:   Maximo \"Gene\" Merline is a 78 y.o. male who presents for admission to [facility] following a hospitalization for a fall where he was found down at his house for approximately two days. According to admission records from the hospital and per patient, he fell last Wednesday Oct 31st, and was unable to get himself up. He did not hit his head when he fell.  He crawled along like an \"inchworm\" until a physical therapist came to check on him after he missed an appt. He was brought to the hospital via ambulance. His was hospitalized from 11/2/18 - 11/5/18. 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    Today, Avel is requesting evaluation for medication management regarding Meniere's Disease. Avel states that he has had Meniere's disease for many years, however he has not had any new issues with it for quite sometime. He was using HCTZ for several years to keep it at bay. He has noticed that somehow the HCTZ fell off of his medication regimen and he is requesting it back. He has a real fear that if he is not using it the Meniere's Disease will flare up and he will get cases of vertigo and fall. He is quite fearful of this and would like to restart the HCTZ. A review of notes shows that he has not been taking it while in TCU or since November. He denies fevers/chills, cough or cold symptoms, vision changes, chest pain/pressure, difficulty breathing, SOB, nausea, vomiting, diarrhea, dysuria, increasing weakness, increasing pain.     Past Medical History:  Past Medical History:   Diagnosis Date   ? Anemia    ? Arthritis     right shoulder region   ? Blockage of coronary artery of heart (H)    ? Calcific tendinitis     right shoulder   ? Cancer (H)     prostate   ? Chronic ischemic heart disease    ? Chronic pain     both shoulders   ? Chronic pain of both shoulders    ? Coronary atherosclerosis    ? Dermatochalasis    ? Gastric reflux    ? HLD (hyperlipidemia)    ? HTN (hypertension)    ? Hypothyroidism    ? IT band syndrome    ? Meniere's disease in remission    ? 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 Nuclear sclerosis    ? Obstructive hydrocephalus    ? Osteoarthritis     right and left glenohumeral joint   ? Primary osteoarthritis of both shoulders    ? Prostate cancer (H) 07/18/2017    metastatic to bone   ? Raynaud disease    ? Rhabdomyolysis    ? Rotator cuff tear     complete right shoulder   ? Senile cataract    ? Single  vessel coronary artery disease    ? Thyroiditis    ? Vitreous degeneration    ? Vitreous opacities            Surgical History:  Past Surgical History:   Procedure Laterality Date   ? CYST REMOVAL  1983    foot    ? INGUINAL HERNIA REPAIR  07/30/2007   ? OTHER SURGICAL HISTORY  11/23/2018    Placement of a right frontal external ventricular drain   ? POSTERIOR FOSSA DECOMPRESSION  11/26/2018    craniectomy and resection of tumor   ? REVERSE TOTAL SHOULDER ARTHROPLASTY  04/25/2018   ? supraglottic-LMA  04/25/2018   ? TONSILLECTOMY         Family History:   Family History   Problem Relation Age of Onset   ? Hypertension Mother    ? Coronary artery disease Father    ? Hypertension Father    ? Coronary artery disease Brother    ? Hypertension Brother        Social History:    Social History     Socioeconomic History   ? Marital status: Single     Spouse name: None   ? Number of children: None   ? Years of education: None   ? Highest education level: None   Social Needs   ? Financial resource strain: None   ? Food insecurity - worry: None   ? Food insecurity - inability: None   ? Transportation needs - medical: None   ? Transportation needs - non-medical: None   Occupational History   ? None   Tobacco Use   ? Smoking status: Never Smoker   ? Smokeless tobacco: Never Used   Substance and Sexual Activity   ? Alcohol use: Yes     Alcohol/week: 4.2 - 8.4 oz     Types: 7 - 14 Standard drinks or equivalent per week   ? Drug use: No   ? Sexual activity: Not Currently   Other Topics Concern   ? None   Social History Narrative   ? None          Review of Systems   See HPI.     Vitals:    02/04/19 1847   BP: 125/77   Pulse: 75   Resp: 18   Temp: 97.7  F (36.5  C)   SpO2: 95%   Weight: 177 lb 14.4 oz (80.7 kg)       Physical Exam   General appearance: alert, appears stated age and cooperative  HEENT: Head is normocephalic with normal hair distribution. No evidence of trauma. Ears: Without lesions or deformity. No acute purulent  discharge. Eyes: Conjunctivae pink with no scleral icterus or erythema. Nose: Normal mucosa and septum. Oropharnyx: mmm, no lesions present.  Lungs: respirations without effort  Extremities: extremities normal, atraumatic, no cyanosis or edema  Pulses: 2+ and symmetric  Skin: Skin color, texture, turgor normal. No rashes or lesions.    Neurologic: Grossly normal   Psych: interacts well with caregivers, exhibits logical thought processes and connections, pleasant      Medication List:  Current Outpatient Medications   Medication Sig   ? acetaminophen (TYLENOL) 500 MG tablet Take 1,000 mg by mouth 3 (three) times a day (max 4gm acetaminophen in 24/hr).         ? atenolol (TENORMIN) 25 MG tablet Take 25 mg by mouth daily .         ? atorvastatin (LIPITOR) 80 MG tablet Take 80 mg by mouth at bedtime .         ? bicalutamide (CASODEX) 50 MG tablet Take 50 mg by mouth daily.   ? lansoprazole (PREVACID) 30 MG capsule Take 30 mg by mouth 2 (two) times a day.   ? levothyroxine (SYNTHROID, LEVOTHROID) 150 MCG tablet Take 150 mcg by mouth daily on empty stomach.         ? losartan (COZAAR) 25 MG tablet Take 25 mg by mouth daily , hold if SBP <100.         ? melatonin 5 mg Tab tablet Take 5 mg by mouth at bedtime for insomnia.         ? multivitamin therapeutic tablet Take 1 tablet by mouth daily.   ? NIFEdipine (PROCARDIA XL) 30 MG 24 hr tablet Take 30 mg by mouth daily . Take before meal..         ? oxyCODONE (ROXICODONE) 5 MG immediate release tablet Take 5 mg by mouth every 8 (eight) hours as needed for pain (pain level 8-10).   ? polyethylene glycol (MIRALAX) 17 gram packet Take 17 g by mouth daily as needed.   ? senna-docusate (PERICOLACE) 8.6-50 mg tablet Take 2 tablets by mouth 2 (two) times a day as needed.        Labs:  No labs today    Assessment:    ICD-10-CM    1. Inactive Meniere's disease of both ears H81.03        Plan:  Meniere's Disease  -HCTZ 12.5 mg by mouth daily.   -Daily blood pressures and record.    -Follow up as needed.     Otherwise continue current care plan for all other chronic medical conditions, as they are stable. Encouraged patient to engage in healthy lifestyle behaviors such as engaging in social activities, exercising (PT/OT), eating well, and following care plan. Follow up for routine check-up, or sooner if needed. Will continue to monitor patient and work with nursing staff collaboratively to work toward positive patient outcomes.    Total unit time of 25 minutes spent with patient and nursing staff with greater than 50% of this time spent on review of previous records, counseling, education, and discussion of the above care plan with nursing staff and patient.     Electronically signed by: Amberly Bynum CNP

## 2021-06-18 NOTE — LETTER
Letter by Lorenza Sanchez MD at      Author: Lorenza Sanchez MD Service: -- Author Type: --    Filed:  Encounter Date: 1/21/2019 Status: (Other)         Patient: Maximo Rick   MR Number: [id number]   YOB: 1940   Date of Visit: 1/21/2019     [facility]    Facility:   [facility] [[id number]]   Code Status: POLST AVAILABLE    Reassessment of 78-year-old male with prostate cancer metastatic to bone, recent hospitalization for rhabdomyolysis, had fallen on multiple occasions, down for greater than 24 hours, transferred to TCU post IV hydration and stabilization in hospital, readmitted to hospital with progressive encephalopathy, underwent resection of cerebellar metastatic lesion and drain of hydrocephalus, continues in TCU for rehabilitation and management of medical problems which include hypertension, hypothyroidism, mild memory deficit, significant deconditioning. Has started whole brain radiation.    Review of systems: reports he saw his urologist today. PSA is stable. No new lesions cerebral per patient report, urology report not available for review. Rubbing area left great toe minimally painful, generalized toe pain decreasing post recent toe nail trimming. Denies chest pain or palpitations. Aware of mild memory deficit, cognition is improving. No pain related to bone metastases. Continues in bed majority of time, participating in physical therapy. Remainder of 12 point review of systems obtained negative.    Exam: blood pressure 134/72, pulse 72, respiratory 16. In bed, cooperative, oriented in entirety. Highly conversant. No facial asymmetry. No cervical adenopathy. Thyroid without nodularity, finally granular texture. S1 and S2 regular with faint systolic murmur. Pulmonary exam without rales rhonchi or wheezes. Minimal soft-tissue swelling bilateral hands, hand grasp symmetrical. Lower extremity with trace negligible edema, pedal  pulses minus one and symmetrical. Sensation decreased toes. Strength and muscle tone diminished and symmetrical.    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   Hypertension with adequate control of blood pressure, recent hypotension resolved, no adjustment in antihypertensives indicated.   Mild memory deficit, cognition continues to improve.   Deconditioning with ongoing need for rehabilitation.   Concerns of patient and nursing staff reviewed, medications reviewed      Electronically signed by: Lorenza Sanchez MD

## 2021-06-18 NOTE — LETTER
Letter by Lorenza Sanchez MD at      Author: Lorenza Sanchez MD Service: -- Author Type: --    Filed:  Encounter Date: 1/4/2019 Status: (Other)         Patient: Maximo Rick   MR Number: [id number]   YOB: 1940   Date of Visit: 1/4/2019     [facility]    Facility:   [facility] [[id number]]   Code Status: POLST AVAILABLE    Reassessment of 78-year-old male with hypertension, DJD of multiple joints, mild dementia, prostate cancer metastatic bone, recent hospitalization for rhabdomyolysis, readmitted to hospital and found to have cerebellar mass, questionable variant of prostate cancer versus new unknown primary with metastases, underwent resection of mass, transferred back to TCU. Recent significant anal pain, decline in clinical status and strength.    Review of systems: no pain gradually improving. No loose stools. Sense of pressure rectally intermittent. Denies sweats or chills. Please cognition is improving. Per appears to be in bed throughout the day, states he is however participating in physical therapy. Denies active joint discomfort. No seizure activity. No focal neurologic deficits. Remainder of 12 point review of systems obtained negative.    Exam: vital signs reviewed over past four days. Alert, oriented times two, semi upright in bed, cooperative and highly conversant. No facial asymmetry. Pharynx without erythema. No cervical or supraclavicular adenopathy. Extraocular movements intact. S1 and S2 regular with 2/6 systolic murmur. Pulmonary exam without rales rhonchi or wheezes. No hand drift. Abdomen without masses tenderness organomegaly. Periphery with trace venous stasis changes. Decreased muscle tone distal lower extremities. DJD bilateral knees without acute inflammatory change.    Impression and plan:   metastatic prostate cancer, followed by oncology, recent cerebellar mass resection, acute confusion slowly resolving,  paranoia less prominent, no evidence of hallucinations or delusions, no seizure activity, known remaining brain metastases.   Hypertension, relatively low blood pressure recordings, continue to monitor, no orthostatic changes.   Decline in clinical status with decreased strength, reluctant to get out of bed, encouraged increased activity.   DJD bilateral knees not currently symptomatic.   Anal fissure, pain resolving with BID application of lidocaine with nitroglycerin. No active loose stools or constipation.   Multiple concerns reviewed with patient and nursing staff.      Electronically signed by: Lorenza Sanchez MD

## 2021-06-18 NOTE — LETTER
Letter by Lorenza Sanchez MD at      Author: Lorenza Sanchez MD Service: -- Author Type: --    Filed:  Encounter Date: 1/18/2019 Status: (Other)         Patient: Maximo Rick   MR Number: [id number]   YOB: 1940   Date of Visit: 1/18/2019     [facility]    Facility:   [facility] [[id number]]   Code Status: POLST AVAILABLE    Reassessment of 78-year-old male initially admitted to hospital after falling, unable to upright self for greater than 24 hours, treated for rhabdomyolysis, transferred to TCU, readmitted to hospital with progressive encephalopathy, underwent resection of metastatic lesion cerebellar and drain of hydrocephalus. Continues in TCU for rehabilitation.    Review of systems: to begin whole brain radiation. Concerned regarding potential side effects. Tender left great toe tip, has seen podiatrist, toenails have been trimmed. Denies chest pain or palpitations. Aware of mild memory deficit, cognition slowly improving. No chest pain or palpitations. No focal neurologic deficits. Remainder of 12 point review of systems obtained negative.    Exam: vital signs reviewed over past 72 hours. Alert, oriented, conversant, lying supine in bed. No facial asymmetry. No pharyngeal erythema. No cervical adenopathy. Extraocular movements intact. S1 and S2 regular. Pulmonary exam without rales rhonchi or wheezes. Abdomen without masses or tenderness. Trace venous stasis changes bilateral lower extremities. Tip of left toe is bandaged and not observed directly.  DJD changes multiple joints without acute inflammatory change or synovitis.    Impression and plan:   prostate cancer metastatic to bone, no pain.   Recent resection metastatic brain lesion, to begin whole brain radiation for metastatic lesions.   Mild memory deficit without behavioral abnormalities.   Hypertension with satisfactory control of blood pressure.   DJD of multiple joints, not  currently symptomatic.   Deconditioning, continued need for rehabilitation, patient spending majority of time in bed.   Medications and multiple concerns are reviewed, discussion with friend in attendance regarding status per patient request.      Electronically signed by: Lorenza Sanchez MD

## 2021-06-18 NOTE — LETTER
Letter by Lorenza Sanchez MD at      Author: Lorenza Sanchez MD Service: -- Author Type: --    Filed:  Encounter Date: 12/31/2018 Status: (Other)         Patient: Maximo Rick   MR Number: [id number]   YOB: 1940   Date of Visit: 12/31/2018     [facility]    Facility:   [facility] [[id number]]   Code Status: POLST AVAILABLE    Reassessment of 78-year-old male with widely metastatic prostate cancer, recent resection of cerebellar mass and drain of hydrocephalus, known multiple bone metastases without pain, continues in TCU for rehabilitation and management of medical problems which include hypertension, dementia. Recent initiation of lidocaine with nitroglycerin topical for highly symptomatic anal fissure.    Review of systems systems: continues with symptomatic rectal discomfort, finds topical application of gel to be beneficial, requests BID application. Mild dysuria new in onset. Denies chest pain or palpitations. No seizure activity or focal neurologic deficits. No fever sweats or chills. Decreased energy level. Remainder of 12 point review of systems obtained negative.    Exam: vital signs reviewed over past 72 hours. Lying in bed, cooperative, pleasant, oriented times two. No facial asymmetry. No pharyngeal erythema. No cervical adenopathy. S1 and S2 regular. Pulmonary exam without rales rhonchi or wheezes. Abdomen without masses or tenderness, normal active bowl sounds. Periphery without edema. Degenerative changes of knees and digits without acute inflammatory change.    Impression and plan:   hypertension, satisfactory control of blood pressure.   Symptomatic anal fissure, loose stooling 72 hours ago has resolved, continue senna S2 tablets b.i.d. with MiraLAX and metamucil. Lidocaine with nitroglycerin topical gel increased to BID dosing for anal fissure.   New onset dysuria, urine culture ordered.   Dementia, cognition remains stable.  Previous encephalopathy resolved.   Metastases to bone without pain.   Multiple concerns are reviewed, discussion with patient and nursing staff.      Electronically signed by: Lorenza Sanchez MD

## 2021-06-18 NOTE — LETTER
"Letter by Amberly Bynum CNP at      Author: Amberly Bynum CNP Service: -- Author Type: --    Filed:  Encounter Date: 1/28/2019 Status: (Other)         Patient: Maximo Rick   MR Number: [id number]   YOB: 1940   Date of Visit: 1/28/2019     [facility]      Facility:    [facility] [[id number]]  Code Status: DNR and POLST AVAILABLE      Chief Complaint/Reason for Visit:  Chief Complaint   Patient presents with   ? Problem Visit     constipation       HPI:   Maximo \"Gene\" Merline is a 78 y.o. male who presents for admission to [facility] following a hospitalization for a fall where he was found down at his house for approximately two days. According to admission records from the hospital and per patient, he fell last Wednesday Oct 31st, and was unable to get himself up. He did not hit his head when he fell.  He crawled along like an \"inchworm\" until a physical therapist came to check on him after he missed an appt. He was brought to the hospital via ambulance. His was hospitalized from 11/2/18 - 11/5/18. 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    Today, Avel is requesting evaluation for his current bowel regimen. He states that he usually has constipation but has been doing well and thinks his medications can be decreased. He is currently taking senna and miralax. He does enjoy drinking the prune juice and feels that may be enough to get him to go to the bathroom. Avel agrees that we could try as needed medications at this point. He denies fevers/chills, cough or cold symptoms, vision changes, chest pain/pressure, difficulty breathing, SOB, nausea, vomiting, diarrhea, dysuria, increasing weakness, increasing pain.     Past Medical History:  Past Medical History:   Diagnosis Date   ? Anemia    ? Arthritis     right shoulder region   ? Blockage of coronary artery of heart (H)    ? Calcific tendinitis     right shoulder   ? Cancer (H)     prostate   ? Chronic ischemic heart disease    ? Chronic pain     both shoulders   ? Chronic pain of both shoulders    ? Coronary atherosclerosis    ? Dermatochalasis    ? Gastric reflux    ? HLD (hyperlipidemia)    ? HTN (hypertension)    ? Hypothyroidism    ? IT band syndrome    ? Meniere's disease in remission    ? 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 Nuclear sclerosis    ? Obstructive hydrocephalus    ? Osteoarthritis     right and left glenohumeral joint   ? Primary osteoarthritis of both shoulders    ? Prostate cancer (H) 07/18/2017    metastatic to bone   ? Raynaud disease    ? Rhabdomyolysis    ? Rotator cuff tear     complete right shoulder   ? Senile cataract    ? Single vessel coronary artery disease    ? Thyroiditis    ? Vitreous degeneration    ? Vitreous opacities            Surgical History:  Past Surgical History:   Procedure Laterality Date   ? CYST REMOVAL  1983    foot    ? INGUINAL HERNIA REPAIR  07/30/2007   ? OTHER  SURGICAL HISTORY  11/23/2018    Placement of a right frontal external ventricular drain   ? POSTERIOR FOSSA DECOMPRESSION  11/26/2018    craniectomy and resection of tumor   ? REVERSE TOTAL SHOULDER ARTHROPLASTY  04/25/2018   ? supraglottic-LMA  04/25/2018   ? TONSILLECTOMY         Family History:   Family History   Problem Relation Age of Onset   ? Hypertension Mother    ? Coronary artery disease Father    ? Hypertension Father    ? Coronary artery disease Brother    ? Hypertension Brother        Social History:    Social History     Socioeconomic History   ? Marital status: Single     Spouse name: None   ? Number of children: None   ? Years of education: None   ? Highest education level: None   Social Needs   ? Financial resource strain: None   ? Food insecurity - worry: None   ? Food insecurity - inability: None   ? Transportation needs - medical: None   ? Transportation needs - non-medical: None   Occupational History   ? None   Tobacco Use   ? Smoking status: Never Smoker   ? Smokeless tobacco: Never Used   Substance and Sexual Activity   ? Alcohol use: Yes     Alcohol/week: 4.2 - 8.4 oz     Types: 7 - 14 Standard drinks or equivalent per week   ? Drug use: No   ? Sexual activity: Not Currently   Other Topics Concern   ? None   Social History Narrative   ? None          Review of Systems   See HPI.     Vitals:    01/28/19 2223   BP: 111/65   Pulse: 62   Resp: 18   Temp: 99  F (37.2  C)   SpO2: 91%   Weight: 177 lb 14.4 oz (80.7 kg)       Physical Exam   General appearance: alert, appears stated age and cooperative  HEENT: Head is normocephalic with normal hair distribution. No evidence of trauma. Ears: Without lesions or deformity. No acute purulent discharge. Eyes: Conjunctivae pink with no scleral icterus or erythema. Nose: Normal mucosa and septum. Oropharnyx: mmm, no lesions present.  Lungs: respirations without effort  Abdomen: soft, nontender; bowel sounds normal; no masses, no organomegaly  Extremities:  extremities normal, atraumatic, no cyanosis or edema  Pulses: 2+ and symmetric  Skin: Skin color, texture, turgor normal. No rashes or lesions.    Neurologic: Grossly normal   Psych: interacts well with caregivers, exhibits logical thought processes and connections, pleasant      Medication List:  Current Outpatient Medications   Medication Sig   ? acetaminophen (TYLENOL) 500 MG tablet Take 1,000 mg by mouth 3 (three) times a day (max 4gm acetaminophen in 24/hr).         ? atenolol (TENORMIN) 25 MG tablet Take 25 mg by mouth daily .         ? atorvastatin (LIPITOR) 80 MG tablet Take 80 mg by mouth at bedtime .         ? bicalutamide (CASODEX) 50 MG tablet Take 50 mg by mouth daily.   ? lansoprazole (PREVACID) 30 MG capsule Take 30 mg by mouth 2 (two) times a day.   ? levothyroxine (SYNTHROID, LEVOTHROID) 150 MCG tablet Take 150 mcg by mouth daily on empty stomach.         ? losartan (COZAAR) 25 MG tablet Take 25 mg by mouth daily , hold if SBP <100.         ? melatonin 5 mg Tab tablet Take 5 mg by mouth at bedtime for insomnia.         ? multivitamin therapeutic tablet Take 1 tablet by mouth daily.   ? NIFEdipine (PROCARDIA XL) 30 MG 24 hr tablet Take 30 mg by mouth daily . Take before meal..         ? oxyCODONE (ROXICODONE) 5 MG immediate release tablet Take 5 mg by mouth every 8 (eight) hours as needed for pain (pain level 8-10).   ? polyethylene glycol (MIRALAX) 17 gram packet Take 17 g by mouth daily as needed.   ? senna-docusate (PERICOLACE) 8.6-50 mg tablet Take 2 tablets by mouth 2 (two) times a day as needed.        Labs:  No labs today    Assessment:    ICD-10-CM    1. Drug-induced constipation K59.03        Plan:  Constipation  -Miralax 17g mixed in 8 ounces of juice or water daily as needed.   -Prune juice 8 ounces daily.   -Senna 1 tab by mouth daily as needed.   -Encouraged patient to increase fiber in diet, eat a lot of fruits and vegetables, increase water intake.  -Exercise as much as possible.    -Follow up as needed.     Otherwise continue current care plan for all other chronic medical conditions, as they are stable. Encouraged patient to engage in healthy lifestyle behaviors such as engaging in social activities, exercising (PT/OT), eating well, and following care plan. Follow up for routine check-up, or sooner if needed. Will continue to monitor patient and work with nursing staff collaboratively to work toward positive patient outcomes.    Electronically signed by: Amberly Bynum CNP

## 2021-06-18 NOTE — LETTER
"Letter by Amberly Bynum CNP at      Author: Amberly Bynum CNP Service: -- Author Type: --    Filed:  Encounter Date: 2/25/2019 Status: (Other)         Patient: Maximo Rick   MR Number: [id number]   YOB: 1940   Date of Visit: 2/25/2019     [facility]    Facility:   [facility] [[id number]]   Code Status: DNR  PCP: Amberly Bynum CNP   Phone: [phone]   Fax: [phone]      CHIEF COMPLAINT/REASON FOR VISIT:  Chief Complaint   Patient presents with   ? Discharge Summary       HISTORY COURSE:  Maximo \"Gene\" Merline is a 78 y.o. male who presents for admission to [facility] following a hospitalization for a fall where he was found down at his house for approximately two days. According to admission records from the hospital and per patient, he fell last Wednesday Oct 31st, and was unable to get himself up. He did not hit his head when he fell.  He crawled along like an \"inchworm\" until a physical therapist came to check on him after he missed an appt. He was brought to the hospital via ambulance. His was hospitalized from 11/2/18 - 11/5/18. 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    Today Mr. Rick is being seen for a review of multiple medical conditions prior to discharge from LTC.  Gene reported that he does not have any specific concerns at this visit and is looking forward to having more independence at his future residence at [facility] [address] [facility].  He stated that he feels comfortable managing his own medications when he transitions to assisted living and has the ultimate goal of returning home.  We discussed his bowel regimen and he would like to continue to utilize his second senna dose as needed for constipation.  His blood pressure has been well-controlled on losartan, nifedipine, atenolol, and hydrochlorothiazide with SBP 90-110s.  The patient denied lightheadedness, dizziness, breathing difficulty, chest pain, palpitations, constipation, urinary symptoms, numbness or tingling in extremities, and pain.     Past Medical History:   Diagnosis Date   ? Anemia    ? Arthritis     right shoulder region   ? Blockage of coronary artery of heart (H)    ? Calcific tendinitis     right shoulder   ? Cancer (H)     prostate   ? Chronic ischemic heart disease    ? Chronic pain     both shoulders   ? Chronic pain of both shoulders    ? Coronary atherosclerosis    ? Dermatochalasis    ? Gastric reflux    ? HLD (hyperlipidemia)    ? HTN (hypertension)    ? Hypothyroidism    ? IT band syndrome    ? Meniere's disease in remission    ? 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 Nuclear sclerosis    ? Obstructive hydrocephalus    ? Osteoarthritis     right and left glenohumeral joint   ? Primary osteoarthritis of both shoulders    ? Prostate cancer (H) 07/18/2017    metastatic to bone   ? Raynaud disease    ? Rhabdomyolysis    ? Rotator cuff tear     complete right shoulder   ?  Senile cataract    ? Single vessel coronary artery disease    ? Thyroiditis    ? Vitreous degeneration    ? Vitreous opacities      Past Surgical History:   Procedure Laterality Date   ? CYST REMOVAL  1983    foot    ? INGUINAL HERNIA REPAIR  07/30/2007   ? OTHER SURGICAL HISTORY  11/23/2018    Placement of a right frontal external ventricular drain   ? POSTERIOR FOSSA DECOMPRESSION  11/26/2018    craniectomy and resection of tumor   ? REVERSE TOTAL SHOULDER ARTHROPLASTY  04/25/2018   ? supraglottic-LMA  04/25/2018   ? TONSILLECTOMY       Social History     Socioeconomic History   ? Marital status: Single     Spouse name: Not on file   ? Number of children: Not on file   ? Years of education: Not on file   ? Highest education level: Not on file   Occupational History   ? Not on file   Social Needs   ? Financial resource strain: Not on file   ? Food insecurity:     Worry: Not on file     Inability: Not on file   ? Transportation needs:     Medical: Not on file     Non-medical: Not on file   Tobacco Use   ? Smoking status: Never Smoker   ? Smokeless tobacco: Never Used   Substance and Sexual Activity   ? Alcohol use: Yes     Alcohol/week: 4.2 - 8.4 oz     Types: 7 - 14 Standard drinks or equivalent per week   ? Drug use: No   ? Sexual activity: Not Currently   Lifestyle   ? Physical activity:     Days per week: Not on file     Minutes per session: Not on file   ? Stress: Not on file   Relationships   ? Social connections:     Talks on phone: Not on file     Gets together: Not on file     Attends [employer] service: Not on file     Active member of club or organization: Not on file     Attends meetings of clubs or organizations: Not on file     Relationship status: Not on file   ? Intimate partner violence:     Fear of current or ex partner: Not on file     Emotionally abused: Not on file     Physically abused: Not on file     Forced sexual activity: Not on file   Other Topics Concern   ? Not on file   Social History  Narrative   ? Not on file     PHYSICAL EXAM:   /62   Pulse 92   Temp 97.8  F (36.6  C)   Resp 18   Wt 182 lb (82.6 kg)   SpO2 95%     General Appearance:    Alert, cooperative, no distress, appears stated age   Head:    Normocephalic, without obvious abnormality, atraumatic   Eyes:    PERRL, conjunctiva/corneas clear, both eyes        Ears:    Normal external ear canals, both ears   Nose:   Nares normal, septum midline, mucosa normal, no drainage    or sinus tenderness   Throat:   Lips, mucosa, and tongue normal; teeth and gums normal   Neck:   Supple, symmetrical, trachea midline, no adenopathy;        thyroid:  No enlargement/tenderness/nodules; no carotid    bruit or JVD   Back:     Symmetric, no curvature, ROM normal, no CVA tenderness   Lungs:     Clear to auscultation bilaterally, respirations unlabored   Chest wall:    No tenderness or deformity   Heart:    Regular rate and rhythm, S1 and S2 normal, no murmur, rub   or gallop   Abdomen:     Soft, non-tender, bowel sounds active all four quadrants,     no masses, no organomegaly   Genitalia:    Deferred at this visit.   Rectal:    Deferred at this visit.   Extremities:   Extremities normal, atraumatic, no cyanosis or edema   Pulses:   2+ and symmetric all extremities   Skin:   Skin color, texture, turgor normal, no rashes or lesions   Neurologic:   Oriented to person, place, time, and situation.  Generalized weakness.  Wheelchair bound.       MEDICATION LIST:  Current Outpatient Medications   Medication Sig   ? acetaminophen (TYLENOL) 500 MG tablet Take 1,000 mg by mouth 3 (three) times a day (max 4gm acetaminophen in 24/hr).         ? atenolol (TENORMIN) 25 MG tablet Take 25 mg by mouth daily .         ? atorvastatin (LIPITOR) 80 MG tablet Take 80 mg by mouth at bedtime .         ? bicalutamide (CASODEX) 50 MG tablet Take 50 mg by mouth daily.   ? hydroCHLOROthiazide (HYDRODIURIL) 12.5 MG tablet Take 12.5 mg by mouth daily.   ? lansoprazole  (PREVACID) 30 MG capsule Take 30 mg by mouth 2 (two) times a day.   ? levothyroxine (SYNTHROID, LEVOTHROID) 150 MCG tablet Take 150 mcg by mouth daily on empty stomach.         ? losartan (COZAAR) 25 MG tablet Take 25 mg by mouth daily , hold if SBP <100.         ? melatonin 5 mg Tab tablet Take 6 mg by mouth at bedtime. for insomnia         ? multivitamin therapeutic tablet Take 1 tablet by mouth daily.   ? NIFEdipine (PROCARDIA XL) 30 MG 24 hr tablet Take 30 mg by mouth daily . Take before meal..         ? oxyCODONE (ROXICODONE) 5 MG immediate release tablet Take 5 mg by mouth every 8 (eight) hours as needed for pain (pain level 8-10).   ? senna-docusate (PERICOLACE) 8.6-50 mg tablet Take 1 tablet by mouth daily. May give 1 tab as needed daily in addition to scheduled daily dose.         ? silver sulfADIAZINE (SILVADENE, SSD) 1 % cream Apply 1 application topically daily. Apply to tip of the left great toe daily for blister.  Cover with gauze, cling, and paper tape after silvadene application to left great toe.       DISCHARGE DIAGNOSIS:    ICD-10-CM    1. Hypertension, unspecified type I10    2. Hyperlipidemia, unspecified hyperlipidemia type E78.5    3. Drug-induced constipation K59.03    4. Physical deconditioning R53.81    5. 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   -Nothing greater than this due to risk of gastric bleed.   -Atorvastatin 80 mg by mouth daily.   -Follow up as needed.     Hypertension- chronic, stable  -Encouraged cardiac diet, low sodium diet, exercise and stress reduction techniques.   -Emphasized importance of blood pressure control.   -Continue losartan, nifedipine, atenolol, and hydrochlorothiazide as prescribed.   -Follow up per routine schedule or sooner with any complaints or concerns.     Constipation- chronic, stable  -Senna 1 tab by mouth daily and 1 tad daily as  needed  -Encouraged patient to increase fiber in diet, eat a lot of fruits and vegetables, increase water intake.  -Exercise as much as possible.   -Follow up as needed.     Physical deconditioning- acute, stable  -PT/OT per therapy recommendations at discharge    Metastatic small cell carcinoma- chronic, stable  -Treatment as directed per oncologist    Case Management:  I have reviewed the facility/SNF care plan/MDS which was done 2/18/2019, including the falls risk, nutrition and pain screening. I also reviewed the current immunizations, and preventive care.. Future cancer screening is not clinically indicated secondary to age/goals of care.   Patient's desire to return to the community is present and he is returning to semi-independent living today..    Information reviewed:  Medications, vital signs, orders, and nursing notes.     Otherwise continue current care plan for all other chronic medical conditions, as they are stable. Encouraged patient to engage in healthy lifestyle behaviors such as engaging in social activities, exercising (PT/OT), eating well, and following care plan. Follow up for routine check-up, or sooner if needed. Will continue to monitor patient and work with nursing staff collaboratively to work toward positive patient outcomes.    MEDICAL EQUIPMENT NEEDS:    The patient has mobility limitation significantly impairing his ability to participate in mobility-related activities of daily living, such as toileting, feeding, dressing, grooming, and bathing in customary locations in the home. The mobility limitation cannot be sufficiently and safely resolved by use of an appropriately fitted wheelchair. The patient or caregiver is able to safely use a manual wheelchair. The patient's functional mobility deficit can be sufficiently resolved by the use of a manual wheelchair.     The patient has mobility limitations that impairs him from doing activities of daily living without use of a wheelchair  to be mobile in the home.    DISCHARGE PLAN/FACE TO FACE:  I certify that services are/were furnished while this patient was under the care of a physician and that a physician or an allowed non-physician practitioner (NPP), had a face-to-face encounter that meets the physician face-to-face encounter requirements. The encounter was in whole, or in part, related to the primary reason for home health. The patient is confined to his home and needs intermittent skilled nursing, physical therapy, speech-language pathology, or the continued need for occupational therapy. A plan of care has been established by a physician and is periodically reviewed by a physician.  Date of Face-to-Face Encounter: 2/25/19    I certify that, based on my findings, the following services are medically necessary home health services: home health aid for bathing and ADLs, skilled nursing (RN) for medication set-up and teaching, symptoms and disease process monitoring and education, PT for strengthening, balance, endurance and safety within the home, OT for strengthening, ADL needs, adaptive equipment and safety.    My clinical findings support the need for the above skilled services because: (Please write a brief narrative summary that describes what the RN, PT, SLP, or other services will be doing in the home. A list of diagnoses in this section does not meet the CMS requirements.) home health aid for bathing and ADLs, skilled nursing (RN) for medication set-up and teaching, symptoms and disease process monitoring and education, PT for strengthening, balance, endurance and safety within the home, OT for strengthening, ADL needs, adaptive equipment and safety.    This patient is homebound because: (Please write a brief narrative summary describing the functional limitations as to why this patient is homebound and specifically what makes this patient homebound.) he is a frail elderly gentleman with multiple comorbidities and recent  hospitalizations making it unsafe for him to go out into the community for services.    The patient is, or has been, under my care and I have initiated the establishment of the plan of care. This patient will be followed by a physician who will periodically review the plan of care. Schedule follow up visit with primary care provider within 7 days to reestablish care.    Total unit/floor time of 35 minutes time consisted of the following: time spent with patient, examination of patient, reviewing the record including pertinent labs and completing documentation. Coordination of care time with nursing staff and other healthcare providers 20 minutes, this time was spent on discussion/counseling on current care plan including medical management of chronic health problems and acute health problems, education pertaining to plan, and discussion of the goals of care pertaining to the current outlined plan with nursing staff and patient.      Electronically signed by: Amberly Bynum CNP    Patient evaluated by Amberly Bynum CNP in conjunction with Shelbie Al CNP, who scribed note. Amberly Bnyum CNP, then edited note. Plan agreed upon by patient, float nurse practitioner and nurse practitioner.

## 2021-06-18 NOTE — LETTER
"Letter by Amberly Bynum CNP at      Author: Amberly Bynum CNP Service: -- Author Type: --    Filed:  Encounter Date: 1/25/2019 Status: (Other)         Patient: Maximo Rick   MR Number: [id number]   YOB: 1940   Date of Visit: 1/25/2019     [facility]      Facility:    [facility] [[id number]]  Code Status: DNR and POLST AVAILABLE      Chief Complaint/Reason for Visit:  Chief Complaint   Patient presents with   ? Problem Visit     hemorrhoids       HPI:   Maximo \"Gene\" Merline is a 78 y.o. male who presents for admission to [facility] following a hospitalization for a fall where he was found down at his house for approximately two days. According to admission records from the hospital and per patient, he fell last Wednesday Oct 31st, and was unable to get himself up. He did not hit his head when he fell.  He crawled along like an \"inchworm\" until a physical therapist came to check on him after he missed an appt. He was brought to the hospital via ambulance. His was hospitalized from 11/2/18 - 11/5/18. 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    Today, Avel is requesting evaluation for hemorrhoids. He was currently taking several medications to address open areas and hemorrhoids to his rectum, however he reports that he does not have any of these problems anymore. Avel is requesting that the medication be discontinued. He continues to have good bowel movements per his report and is not straining. He denies fevers/chills, cough or cold symptoms, vision changes, chest pain/pressure, difficulty breathing, SOB, nausea, vomiting, diarrhea, dysuria, increasing weakness, increasing pain.     Past Medical History:  Past Medical History:   Diagnosis Date   ? Anemia    ? Arthritis     right shoulder region   ? Blockage of coronary artery of heart (H)    ? Calcific tendinitis     right shoulder   ? Cancer (H)     prostate   ? Chronic ischemic heart disease    ? Chronic pain     both shoulders   ? Chronic pain of both shoulders    ? Coronary atherosclerosis    ? Dermatochalasis    ? Gastric reflux    ? HLD (hyperlipidemia)    ? HTN (hypertension)    ? Hypothyroidism    ? IT band syndrome    ? Meniere's disease in remission    ? 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 Nuclear sclerosis    ? Obstructive hydrocephalus    ? Osteoarthritis     right and left glenohumeral joint   ? Primary osteoarthritis of both shoulders    ? Prostate cancer (H) 07/18/2017    metastatic to bone   ? Raynaud disease    ? Rhabdomyolysis    ? Rotator cuff tear     complete right shoulder   ? Senile cataract    ? Single vessel coronary artery disease    ? Thyroiditis    ? Vitreous degeneration    ? Vitreous opacities            Surgical History:  Past Surgical History:   Procedure Laterality Date   ? CYST REMOVAL  1983    foot    ? INGUINAL HERNIA REPAIR  07/30/2007   ? OTHER SURGICAL HISTORY  11/23/2018     Placement of a right frontal external ventricular drain   ? POSTERIOR FOSSA DECOMPRESSION  11/26/2018    craniectomy and resection of tumor   ? REVERSE TOTAL SHOULDER ARTHROPLASTY  04/25/2018   ? supraglottic-LMA  04/25/2018   ? TONSILLECTOMY         Family History:   Family History   Problem Relation Age of Onset   ? Hypertension Mother    ? Coronary artery disease Father    ? Hypertension Father    ? Coronary artery disease Brother    ? Hypertension Brother        Social History:    Social History     Socioeconomic History   ? Marital status: Single     Spouse name: None   ? Number of children: None   ? Years of education: None   ? Highest education level: None   Social Needs   ? Financial resource strain: None   ? Food insecurity - worry: None   ? Food insecurity - inability: None   ? Transportation needs - medical: None   ? Transportation needs - non-medical: None   Occupational History   ? None   Tobacco Use   ? Smoking status: Never Smoker   ? Smokeless tobacco: Never Used   Substance and Sexual Activity   ? Alcohol use: Yes     Alcohol/week: 4.2 - 8.4 oz     Types: 7 - 14 Standard drinks or equivalent per week   ? Drug use: No   ? Sexual activity: Not Currently   Other Topics Concern   ? None   Social History Narrative   ? None          Review of Systems   See HPI.     Vitals:    01/25/19 1813   BP: 111/65   Pulse: 62   Resp: 18   Temp: 99  F (37.2  C)   SpO2: 91%   Weight: 177 lb 14.4 oz (80.7 kg)       Physical Exam   General appearance: alert, appears stated age and cooperative  HEENT: Head is normocephalic with normal hair distribution. No evidence of trauma. Ears: Without lesions or deformity. No acute purulent discharge. Eyes: Conjunctivae pink with no scleral icterus or erythema. Nose: Normal mucosa and septum. Oropharnyx: mmm, no lesions present.  Lungs: respirations without effort  Abdomen: soft, nontender; bowel sounds normal; no masses, no organomegaly  Extremities: extremities normal, atraumatic,  no cyanosis or edema  Pulses: 2+ and symmetric  Skin: Skin color, texture, turgor normal. No rashes or lesions.    Neurologic: Grossly normal   Psych: interacts well with caregivers, exhibits logical thought processes and connections, pleasant      Medication List:  Current Outpatient Medications   Medication Sig   ? acetaminophen (TYLENOL) 500 MG tablet Take 1,000 mg by mouth 3 (three) times a day (max 4gm acetaminophen in 24/hr).         ? atenolol (TENORMIN) 25 MG tablet Take 25 mg by mouth daily .         ? atorvastatin (LIPITOR) 80 MG tablet Take 80 mg by mouth at bedtime .         ? bicalutamide (CASODEX) 50 MG tablet Take 50 mg by mouth daily.   ? lansoprazole (PREVACID) 30 MG capsule Take 30 mg by mouth 2 (two) times a day.   ? levothyroxine (SYNTHROID, LEVOTHROID) 150 MCG tablet Take 150 mcg by mouth daily on empty stomach.         ? losartan (COZAAR) 25 MG tablet Take 25 mg by mouth daily , hold if SBP <100.         ? melatonin 5 mg Tab tablet Take 5 mg by mouth at bedtime for insomnia.         ? multivitamin therapeutic tablet Take 1 tablet by mouth daily.   ? NIFEdipine (PROCARDIA XL) 30 MG 24 hr tablet Take 30 mg by mouth daily . Take before meal..         ? oxyCODONE (ROXICODONE) 5 MG immediate release tablet Take 5 mg by mouth every 8 (eight) hours as needed for pain (pain level 8-10).   ? polyethylene glycol (MIRALAX) 17 gram packet Take 17 g by mouth daily as needed.   ? senna-docusate (PERICOLACE) 8.6-50 mg tablet Take 2 tablets by mouth 2 (two) times a day as needed.        Labs:  No labs today    Assessment:    ICD-10-CM    1. Hemorrhoids, unspecified hemorrhoid type K64.9        Plan:  Hemorrhoids- Resolved  -Discontinue annusol, calmoseptine, Nitroglycerine 2% with Lidocaine.   -Continue to use stool softeners for prevention of hemorrhoids.   -Encouraged a diet high in fiber and liquids.   -Follow up as needed.     Otherwise continue current care plan for all other chronic medical conditions,  as they are stable. Encouraged patient to engage in healthy lifestyle behaviors such as engaging in social activities, exercising (PT/OT), eating well, and following care plan. Follow up for routine check-up, or sooner if needed. Will continue to monitor patient and work with nursing staff collaboratively to work toward positive patient outcomes.    Electronically signed by: Amberly Bynum CNP

## 2021-06-18 NOTE — LETTER
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    Review of systems: discussion with patient and with nephew in attendance. Aware of mild confusion. Denies hallucinations. Is willing to have nephew as power of . Has seen oncologist today, understands further intervention including potential brain radiation are being considered, understands that he has metastatic cancer to the brain. Denies cardiac or pulmonary symptoms. No active joint pain or back pain. Minimal pain anally, continues b.i.d. lidocaine with nitroglycerin preparation. Denies orthostatic symptoms. Aware that blood pressure was diminished at oncology appointment today. Remainder of 12 point review of systems obtained negative.    Exam: systolic blood pressure 96, recent vital signs reviewed. In bed, cooperative and pleasant, oriented times two. No facial asymmetry. No muscular fasciculations or tremor. Mucous membranes moist. No HJR. S1 and S2 regular. Pulmonary exam without rales rhonchi or wheezes. Abdomen without masses tenderness organomegaly. Periphery without edema. Scabbed tip left great toe evidence of denuding, erythema without skin breakdown tip of right first and second toe. Pedal pulses -2 and symmetrical. Decreased strength bilateral feet. Questionable early foot  drop.    Impression and plan:   metastatic prostate cancer, known pelvic lumbar sacral metastases, recent findings of cerebellar mass post resection, hydrocephalus drained, remaining brain lesions, oncology issues reviewed  post appointment today, will have repeat MRI of head, CT scan of chest and pelvis, upcoming appointment with neuro- radiology.   Dementia, impaired cognition, nephew willing to assume POA, this reviewed with nephew and patient.   Hypertension, relatively low blood pressure recordings, no orthostatic changes, should  decrease pressures remain lower antihypertensives.   Lesion left great toe, irritation bilateral toe tips, Silvadene to area, monitor foot status.   Progressive decline in strength, rehabilitation indicated, should be up and out of bed.   DJD of multiple joints without acute inflammatory change.   Total time of this evaluation greater than 35 minutes, greater than 20 minutes spent in face-to-face contact with nephew discussion regarding patient's clinical status and long-term prognosis, prolonged discussion with patient regarding medical status, discussion with nursing staff.      Electronically signed by: Lorenza Sanchez MD

## 2021-06-18 NOTE — LETTER
Letter by Lorenza Sanchez MD at      Author: Lorenza Sanchez MD Service: -- Author Type: --    Filed:  Encounter Date: 1/28/2019 Status: (Other)         Patient: Maximo Rick   MR Number: [id number]   YOB: 1940   Date of Visit: 1/28/2019     [facility]    Facility:   [facility] [[id number]]   Code Status: POLST AVAILABLE    Admission evaluation to long-term care of 78-year-old male on transfer from TCU. History is taken from previous hospital and TCU notes as well as from previous knowledge of patient. 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, recent admission to hospital with rhabdomyolysis, had been on chemotherapy, progressive weakness and multiple falls, transferred to TCU post treatment for rhabdomyolysis, readmitted to hospital with progressive encephalopathy, found to have hydrocephalus and brain metastases, biopsy of cerebellar mass  resected showed variant non-small cell adenocarcinoma, no other primary source of carcinoma found, thought potentially to be variant of prostate cancer, returned to TCU, improvement in cognition and general strength, undergoing whole brain radiation for additional brain metastases, limited mobility, profound weakness, cognitive impairment and impaired gait as well as balance prompted transfer to long-term care.    Past medical history, current medical problem list, drug allergies, current medication list, social history, family history, code status reviewed in epic.    Review of systems: denies chest pain or palpitations. Cognition stable. Tolerating whole brain radiation. No focal neurologic deficits. Unable to walk or stand independently. Previous a no pain resolved, nitroglycerin topical to rectal DC N discontinued, reports loose stooling, previous history of constipation. Long-standing DJD of multiple joints, no current knee or shoulder pain  which were previously present, no pain related to bony metastases to lumbar spine and pelvis. Remainder of 12 point review of systems obtained negative.    Exam: vital signs reviewed since transfer to long-term [facility]. Alert, oriented, mildly vague in presentation. Lying supine in bed in no apparent distress. No facial asymmetry. Extraocular movements intact. Pharynx without erythema. Thyroid without nodularity or tenderness. No HJR or cervical adenopathy. S1 and S2 regular with 1/6 systolic murmur. Pulmonary exam without rales rhonchi or wheezes. Modest DJD changes of digital joints. No hand drift. Abdomen without hepatosplenomegaly masses or tenderness. DJD changes bilateral knees without effusion or focal tenderness. Mild venous stasis changes lower extremities. Pedal pulses minus one and symmetrical. Toe tips are not observed directly on this occasion, recent superficial abrasion to great toe, followed by podiatry.    Impression and plan:   prostate cancer metastatic to bone, no pain related to lumbar spinal and pelvic metastases. Followed by urology.   Recent resection cerebellar mass, non-small cell adenocarcinoma, variant of prostate cancer versus other metastatic lesion, additional primary not found with screening.   Additional brain metastases, undergoing radiation therapy.   Mild cognitive impairment, no behavioral abnormalities.   Recent significant encephalopathy preceeding cerebellar mass resection and drain of hydrocephalus, returning to baseline.   Hypertension on multiple antihypertensives with satisfactory control of blood pressure.   Hypothyroidism on replacement.   Gait impairment balance impairment and generalized weakness related to metastatic process and recent cerebellar mass resection. Nothing to suggest seizure activity. Followed by neurosurgery.   Single vessel coronary artery disease without indication of angina.   Recent anal fissure resolved, nitroglycerin topical to rectum has been  discontinued.   Previous constipation resolved, receiving senna S one tablet Q day, other bowel preparations discontinued.   Medications reviewed, multiple concerns reviewed.    Electronically signed by: Lorenza Sanchez MD

## 2021-06-21 NOTE — PROGRESS NOTES
[facility]    Facility:   [facility] [[id number]]   Code Status: POLST AVAILABLE    Data visit 11/9/18    Reassessment of 77-year-old male with metastatic prostate cancer, hypertension, coronary artery disease, hypothyroidism, DJD of knees and shoulders, recent multiple falls, fell at home, unable to upright self, discovered some 48 hours later, admitted to hospital with rhabdomyolysis, extensive abrasions to knees elbows and torso, treated with IV fluids for rhabdomyolysis, stabilized and transferred to TCU for rehabilitation, management of medical problems.    Review of systems: continued generalized aching. Fatigue present at all times. Continued decreased mentation in comparison to baseline. No overt confusion present. No focal neurologic deficits. Continued aching bilateral knees left greater than right, then the knees causes increase in pain. No fever sweats or chills. No urinary symptomatology. Remainder of 12 point review of systems obtained negative.    Exam: blood pressure 119/72, 02 94%, pulse 61, respiratory 19, temperature 99.8. In lounger, appears fatigued. Oriented times two, continues vague in presentation. No facial asymmetry. Mucous membranes moist. No HJR. S1 and S2 regular. Pulmonary exam without adventitious sounds. Abdomen without masses or tenderness. Extensive abrasions bilateral forearms elbows chest wall bilateral knees and lower extremities. Majority of abraded areas are wrapped, no evidence of surrounding erythema or secondary infection. Infrapatellar effusion bilateral with tenderness. Periphery with trace nonpitting edema. Pedal pulses -2 and symmetrical.    Impression and plan:   extensive abrasions knees elbows forearms and torso after being down on floor and crawling about for 48 hours, calmoseptine has been ordered by nurse practitioner, increased moistness of wounds, continue current application, continue cautious observation.    Low-grade temperature elevation, no evidence of infected wounds, observe.   Hypertension, satisfactory control of blood pressure.   Continued generalized myalgias, mildly decreased in intensity, observe.   Metastatic prostate cancer, followed by urology, Casodex on hold.   Coronary artery disease without indication of angina.   Decreased mentation, observe, slums testing while in TCU.   Recent multiple falls with deconditioning, continue rehabilitation measures.   Multiple concerns are reviewed.      Electronically signed by: Lorenza Sanchez MD

## 2021-06-21 NOTE — PROGRESS NOTES
[facility]    Facility:   [facility] [[id number]]   Code Status: POLST AVAILABLE    Reevaluation of 77-year-old male with hypertension, coronary artery disease, metastatic prostate cancer, recent multiple falls, was found down in home setting, rhabdomyolysis required IV hydration, encephalopathy present, transferred to TCU for rehabilitation and management of medical problems. Patient is seen on three occasions on this date by physical therapy nursing and patient request.    Review of systems: continues with confusion. Does not feel his mentation has returned to baseline. Significant fatigue present. Poorly tolerating physical therapy, physical therapy believes he is not willing to participate, patient reports he finds it to be difficult. Vague headache involving frontal head region, physical therapy reported headache did not allow for him to participate in therapy, patient states there are no focal neurologic deficits, headache is mild and has been present previously. Significant knee pain continues. Prefers to be in bed. Denies chest pain palpitations, mildly disoriented, no hallucinations. Additionally with vague abdominal pain, this reported by physical therapy, constipation was present, now resolved, no persistent abdominal pain. Denies fever sweats or chills. Remainder of 12 point review of systems obtained negative.    Exam: in bed, oriented times two, somewhat vague in presentation. Appears drowsy. Blood pressure 112/68, 02 93%, pulse 76, respiratory 18, temperature 98.2. No pharyngeal erythema. No facial asymmetry. Cranial nerves intact. No carotid bruits or HJR. S1 and S2 regular. Pulmonary exam without adventitious sounds. Abdomen without masses or tenderness. Healing abrasions bilateral knees, DJD changes knees, multiple abrasions forearms without evidence of secondary infection. No calf tenderness or swelling. Pedal pulses intact.    Impression and  plan:   rhabdomyolysis, no end organ damage.   Chronic hypertension with adequate control of blood pressure.   Dementia, cognition diminished, no evidence of acute encephalopathy.   Vague headache without evidence of intracranial process.   DJD of multiple joints.   Metastatic prostate cancer to bone, patient reports he previously did not have pain, complains of vague thigh discomfort.   Patient is seen on three occasions, total time of evaluation greater than 40 minutes, greater than 50% of time spent in coordination of care and counseling.      Electronically signed by: Lorenza Sanchez MD

## 2021-06-21 NOTE — PROGRESS NOTES
[facility]    Facility:   [facility] [[id number]]   Code Status: POLST AVAILABLE    Reassessment of 77-year-old male found down in his home setting, recent multiple falls, history of metastatic prostate cancer, recent initiation of casodex, long-standing hypertension on multiple medications, admitted to hospital with rhabdomyolysis, stabilized and transferred to TCU for rehabilitation and management of medical problems.    Review of systems: continued diffuse aching. Aware of memory deficit, confusion present since hospitalization. No focal neurologic deficits. Knees highly symptomatic with pain. No urinary impairment. Pain myofascial, increasing in bilateral thighs. No cardiac or pulmonary symptoms. Difficulty performing in physical therapy due to degree of pain. Remainder of 12 point review of systems obtained negative.    Exam: sitting in lounger, oriented times two, fatigued, conversant and pleasant. Blood pressure 119/72, 02 94%, respiratory 19, temperature 99.8. No facial asymmetry. Somewhat vague in presentation. Mucous membranes moist. Skin turgor intact. S1 and S2 regular. Pulmonary exam without rales rhonchi or wheezes. Abdomen without masses tenderness organomegaly. DJD changes bilateral knees with bony enlargement and focal tenderness. Limited range of motion right greater than left shoulder. No hand drift. Strength and muscle tone diffusely diminished. Extensive abrasions bilateral knees lower extremities forearms elbows and anterior chest. Majority of wounds are covered, those visible show moistness. Nursing staff report increased drainage from wounds, currently with calmoseptine applied .    Hemoglobin 10.5, sodium 13, potassium 3.0, chloride 100, white count 9.1, hemoglobin 10.5, platelet 295.    Impression and plan:   rhabdomyolysis status post IV hydration, no end organ damage.   Multiple abrasions, discontinue calmoseptine, cleanse area daily, dry  dressing. Monitor for infection.   Hypokalemia, begin K Dur 30 mEq Q day with recheck potassium four days.   Hypertension on multiple antihypertensives including losartan nifedipine hydrochlorothiazide, blood pressure control satisfactory.   Persistent myalgias, continues on statin, no active bone pain related to metastatic cancer.   Anemia of chronic disease.   Deconditioning, generalized weakness post fall, need for rehabilitation.   Memory deficit, cognitive testing to be performed, increased level of confusion post rhabdomyolysis and fall.   DJD of multiple joints with highly symptomatic knee discomfort. Ice post physical therapy.  Total time of evaluation greater than 35 minutes, greater than 50% of time spent in coordination of care and counseling.      Electronically signed by: Lorenza Sanchez MD

## 2021-06-21 NOTE — PROGRESS NOTES
"[facility]      Facility:    [facility] [[id number]]  Code Status: DNR and POLST AVAILABLE      Chief Complaint/Reason for Visit:  Chief Complaint   Patient presents with     Review Of Multiple Medical Conditions     physical deconditioning, s/p fall and hospitalization, abrasions, OA of shoulder, knees       HPI:   Maximo \"Avel\" Merline is a 77 y.o. male who presents for [facility] to [facility] following a hospitalization for a fall where he was found down at his house for approximately two days. According to admission records from the hospital and per patient, he fell last Wednesday Oct 31st, and was unable to get himself up. He did not hit his head when he fell.  He crawled along like an \"inchworm\" until a physical therapist came to check on him after he missed an appt. He was brought to the hospital via ambulance. His was hospitalized from 11/2/18 - 11/5/18.     Today, Avel is being evaluated for a review of multiple medical problems. He is laying in bed and very friendly. He states he is doing much better. Avel lives at his own house, alone, his family is out of state, and he is worried about his cat. Other than that, he is doing well with therapy. He did endorse occasional pain in his knees d/t OA and the tramadol helps, but now he thinks he may be constipated and feels like his belly is really \"stuffed\". His last good BM was 3-4 days ago, but he is passing gas and eating well and no N/V. He gets this constipation at home too, when he takes Norco for his knee and shoulder pain. Also he has a headache every once in a while, across his front of his head from ear to ear, feels achy and constant, the tylenol makes his headache better. Relaxing and laying down also helps his headaches. He also reports his sleep is improving since last week. He denies any other concerns including fevers/chills, cough or cold symptoms, vision changes, chest pain/pressure, difficulty " breathing, SOB, abdominal pain, nausea, vomiting, diarrhea, dysuria, increasing weakness, increasing pain.     Gene was slightly confused today, speech worked with patient for over 45 min to understand his calendar for the month of Nov and his three follow-up appts. He continues to write many notes. Nursing did suggest that patient may have some confusion or forgetfulness.     Past Medical History:  Past Medical History:   Diagnosis Date     Anemia      Arthritis     right shoulder region     Blockage of coronary artery of heart (H)      Calcific tendinitis     right shoulder     Cancer (H)     prostate     Chronic ischemic heart disease      Chronic pain     both shoulders     Coronary atherosclerosis      Gastric reflux      HLD (hyperlipidemia)      HTN (hypertension)      Hypothyroidism      Osteoarthritis     right and left glenohumeral joint     Prostate cancer (H) 07/18/2017    metastatic to bone     Prostate cancer metastatic to bone (H)      Raynaud disease      Rhabdomyolysis      Rotator cuff tear     complete right shoulder     Senile cataract      Thyroiditis      Vitreous degeneration            Surgical History:  Past Surgical History:   Procedure Laterality Date     CYST REMOVAL  1983    foot      INGUINAL HERNIA REPAIR  07/30/2007     REVERSE TOTAL SHOULDER ARTHROPLASTY  04/25/2018     supraglottic-LMA  04/25/2018     TONSILLECTOMY         Family History:   Family History   Problem Relation Age of Onset     Hypertension Mother      Coronary artery disease Father      Hypertension Father      Coronary artery disease Brother      Hypertension Brother        Social History:    Social History     Socioeconomic History     Marital status: Single     Spouse name: Not on file     Number of children: Not on file     Years of education: Not on file     Highest education level: Not on file   Social Needs     Financial resource strain: Not on file     Food insecurity - worry: Not on file     Food insecurity -  inability: Not on file     Transportation needs - medical: Not on file     Transportation needs - non-medical: Not on file   Occupational History     Not on file   Tobacco Use     Smoking status: Never Smoker     Smokeless tobacco: Never Used   Substance and Sexual Activity     Alcohol use: Yes     Alcohol/week: 4.2 - 8.4 oz     Types: 7 - 14 Standard drinks or equivalent per week     Drug use: Not on file     Sexual activity: Not on file   Other Topics Concern     Not on file   Social History Narrative     Not on file          Review of Systems   See HPI.     Vitals:    11/14/18 1625   BP: 118/64   Pulse: 62   Resp: 18   Temp: 98.2  F (36.8  C)   SpO2: 96%   Weight: 206 lb (93.4 kg)       Physical Exam   General appearance: alert, appears stated age and cooperative  HEENT: Head is normocephalic with normal hair distribution. No evidence of trauma. Ears: Without lesions or deformity. No acute purulent discharge. Eyes: Conjunctivae pink with no scleral icterus or erythema. Nose: Normal mucosa and septum. Oropharnyx: mmm, no lesions present.  Lungs: clear to auscultation bilaterally, respirations without effort  Heart: regular rate and rhythm, S1, S2 normal, no murmur, click, rub or gallop  Abdomen: soft, non-tender; bowel sounds normal; no masses,  no organomegaly  Extremities: extremities normal, atraumatic, no cyanosis or edema  Pulses: 2+ and symmetric  Skin: Skin color, texture, turgor normal. No rashes or lesions. Multiple abrasions and scabs across body, all healing: L side of forehead scab, left abdomen, bilateral knees. No signs of erythema or drainage, several scabs have fallen off and intact, pink skin over wounds now.   Neurologic: Grossly normal   Psych: interacts well with caregivers, exhibits logical thought processes and connections, pleasant      Medication List:  Current Outpatient Medications   Medication Sig     acetaminophen (TYLENOL) 325 MG tablet Take 650 mg by mouth every 4 (four) hours as  needed for pain.     aspirin 81 mg chewable tablet Chew 81 mg daily.     atenolol (TENORMIN) 25 MG tablet Take 25 mg by mouth daily.     atorvastatin (LIPITOR) 40 MG tablet Take 80 mg by mouth daily.      bicalutamide (CASODEX) 50 MG tablet Take 50 mg by mouth daily.     co-enzyme Q-10 50 mg capsule Take 50 mg by mouth daily.     hydroCHLOROthiazide (HYDRODIURIL) 25 MG tablet Take 25 mg by mouth daily.     krill-om-3-dha-epa-phospho-ast (MEGARED OMEGA-3 KRILL OIL) 267-75-77-50 mg cap Take 1 capsule by mouth 2 (two) times a day.     levothyroxine (SYNTHROID, LEVOTHROID) 150 MCG tablet Take 150 mcg by mouth daily.     losartan (COZAAR) 25 MG tablet Take 12.5 mg by mouth daily.     melatonin 3 mg Tab tablet Take 3 mg by mouth at bedtime as needed.     menthol-zinc oxide (CALMOSEPTINE) 0.44-20.6 % Oint ointment Apply 1 application topically 2 (two) times a day. Apply to scabs     multivitamin therapeutic tablet Take 1 tablet by mouth daily.     naproxen sodium (ALEVE) 220 MG tablet Take 220 mg by mouth 2 (two) times a day as needed for pain.     NIFEdipine (PROCARDIA XL) 30 MG 24 hr tablet Take 30 mg by mouth daily.     polyethylene glycol (MIRALAX) 17 gram packet Take 17 g by mouth daily as needed.     senna-docusate (PERICOLACE) 8.6-50 mg tablet Take 2 tablets by mouth 2 (two) times a day as needed.      traMADol (ULTRAM) 50 mg tablet Take 50 mg by mouth every 6 (six) hours as needed for pain.       Labs:  No labs today    Assessment:    ICD-10-CM    1. Physical deconditioning R53.81    2. Frequent falls R29.6    3. Osteoarthritis of left shoulder, unspecified osteoarthritis type M19.012    4. Osteoarthritis of both knees, unspecified osteoarthritis type M17.0    5. Abrasions of multiple sites T07.XXXA    6. Essential hypertension I10    7. Prostate cancer metastatic to bone (H) C61     C79.51    8. Insomnia G47.00    9. Pain management R52    10. Constipation, unspecified constipation type K59.00         Plan:  Physical Deconditioning - acute, stable  Osteoarthritis of Knees and Shoulder - chronic, stable  Frequent Falls - acute, unstable  -Continue PT/OT and other therapies as per care plan.  -Encouraged good nutrition and movement habits.   -Discussed care plan and expected course of stay.   -Educated patient regarding fall risk and using call-light before trying to get up.   -Continue to follow-up per routine schedule or sooner if needed.     Abrasions of multiple sites - acute, stable  -No signs of infection like redness, warmth, or drainage. Beginning to scab and heal.  -Continue to cover knee abrasions to prevent further injury  -Monitor for signs of infection and healing  -Vistaril should help with itching.   -Continue to monitor per routine, sooner if concerns develop.     Hypertension - chronic, stable  -BP stable in TCU  -HCTZ 25 mg by mouth daily.   -Atenolol 25 mg by mouth daily.   -Losartan 25 mg by daily.  -Nifedipine ER 1 tab by mouth daily.   -Continue to monitor vitals per routine.   -Will monitor per routine, sooner if concerns develop.     Metastatic Prostate Cancer - chronic, stable  -Per patient, he sees his oncologist every 3 months for treatment, his PSA levels are tested and he is given treatment to inhibit the cancer cells  -Continue current treatment per oncology's recommendation    Insomnia - acute, stable  -Start Vistaril at bedtime to help with sleep (and itching r/t abrasions).   -Will continue to monitor per routine.     Pain Management - acute, stable  -Per patient, the tylenol and tramadol have been helping with the pain in his knees and shoulder  -Continue treatment plan, icing and elevating between PT/OT sessions.   -Tramadol 50 mg by mouth every 6 hours as needed.   -Will monitor per routine, sooner if concerns develop.     Constipation - acute, stable  -Continue current treatment of Senna-S two tablets PO two times a day  -Start Miralax 17mg PO two times a day  -Monitor for  loose stools  -Will reevaluate medication needs as bowel activity normalizes  -Will follow-up per routine, sooner if concerns develop.     Otherwise continue current care plan for all other chronic medical conditions, as they are stable. Encouraged patient to engage in healthy lifestyle behaviors such as engaging in social activities, exercising (PT/OT), eating well, and following care plan. Follow up for routine check-up, or sooner if needed. Will continue to monitor patient and work with nursing staff collaboratively to work toward positive patient outcomes.    Patient evaluated in conjunction with Jerome Butt NP, who scribed note. Amberly Bynum CNP, then edited note. Plan agreed upon by patient, student nurse practitioner and nurse practitioner.     Electronically signed by: Amberly Bynum CNP and jerome Butt NP

## 2021-06-21 NOTE — PROGRESS NOTES
[facility]    Facility:   [facility] [[id number]]   Code Status: POLST AVAILABLE     H&P performed 11/5/18, entered in system by office post initial evaluation date.   Admission evaluation to TCU of 77-year-old male. History is taken from accompanying hospital notes and from patient who gives an adequate history. History of hypertension on three antihypertensives, recent addition of losartan to regimen, metastatic prostate cancer with bone metastases, recently on Casadex, DJD involving knees and shoulders, hypothyroidism on replacement, coronary artery disease, hyperlipidemia, recent multiple falls, fell on 10/31, was unable to extricate himself from entrapment between two pieces of furniture, crawled about his floor for greater then 48 hours, was eventually noted to be absent from a planned meeting by a friend, police were called, found him on the ground, admitted to hospital with rhabdomyolysis, CPK greater then 4000, treated with IV fluids, encephalopathy present, x-rays negative for fracture, extensive abrasions of knees shins forearms and chest wall, stabilized in hospital and transferred to TCU for rehabilitation, management of medical problems.    Past medical history, current medical problem list, drug allergies, current medication list, social history, family history, code status reviewed in epic.    Review of systems: significant pain bilateral knees. Generalized aching, any movement causes aching of extremities. Previous right shoulder surgery, Limited range of motion continues. Denies neurologic or orthostatic changes, no arrhythmia prior to falls, states his knee simply gave way. Did not have strength to lift himself from the floor. No urinary symptoms. Aware of mild memory deficit, aware that confusion has worsened post event of being down a. No similar symptoms in past. No active reflux symptomatology. Remainder of 12 point review of systems obtained  negative.    Exam: patient semi upright in bed, appears younger than stated age. Oriented times two, mildly vague in presentation, decreased cognition in comparison to that observed in this patient by this physician approximately one year ago. Blood pressure 112/68, 02 96%, pulse 64, respiratory 18, temperature 97.4. No facial asymmetry. PERRLA, EOMI. No pharyngeal erythema. No carotid bruits or HJR. Thyroid midline regular without nodularity or tenderness. S1 and S2 regular with faint systolic murmur. Pulmonary exam without rales rhonchi or wheezes. Abdomen without masses tenderness organomegaly. Periphery with nonpitting edema 1 mm. Extensive abrasions bilateral elbows forearms and most pronounced over bilateral knees, contusions chest, mild abrasions  torso. Bilateral knees with small effusion, no evidence of hemearthrosis. Pedal pulses -2 and symmetrical. No calf tenderness or swelling. Strength minus to diffusely, muscle tone adequate.    Impression and plan:   recent multiple falls, questionably related to casadex versus hypotension versus generalized weakness, no end organ damage, recent IV hydration, continue cautious observation of clinical status.   Memory deficit mild, decreased mental acuity post recent event.   Metastatic prostate cancer with bone metastases, without pain preceding recent fall, no bony pain at present, all pain appears to be myofascial.   Hyperlipidemia on statin, should muscle aching continue consider hold on statin.   Hypertension, continues atenolol losartan and nifedipine, blood pressure relatively low, monitor cautiously.   Chronic peripheral edema on HCTZ, monitor for ongoing need while in TCU.   Hypothyroidism on replacement.   Coronary artery disease without indication of angina.   Deconditioning and frequent falls, rehabilitation indicated.   Bilateral knee effusions, monitor, underlying DJD.   Extensive abrasions, no evidence of secondary infection.   Total time of admission  evaluation greater than 45 minutes, greater than 50% of time spent in coordination of care and counseling, outside medical records reviewed, medications reviewed.      Electronically signed by: Lorenza Sanchez MD

## 2021-06-21 NOTE — PROGRESS NOTES
[facility]    Facility:   [facility] [[id number]]   Code Status: POLST AVAILABLE    77-year-old male is seen for reevaluation, management of chronic medical issues, assessment of acute encephalopathy with progressive encephalopathy over past 72 hours, transfer to emergency room. History of metastatic prostate cancer to bone, chronic hypertension, was found down in home setting, hospitalized with rhabdomyolysis, transferred to TCU for rehabilitation, management of multiple superficial abrasions, no fractures identified, CT scan of head not performed during initial hospitalization.    Review of systems: aware of decline in clinical status. Aware of increased confusion. Increased drowsiness. No sweats or chills. Generalized aching bilateral thighs. Denies focal neurologic deficits. No double vision or blurring of vision. Unaware of recent falls. Unaware of head trauma prior to being found down. Remainder of 12 point review of systems obtained negative. Nursing staff report patient has remained afebrile, continued decline in cognitive status, rapidly progressive over past several days.    Exam: vital signs reviewed over past four days, afebrile. Consistent satisfactory blood pressure recordings. Patient sitting upright in chair, appears pale, oriented to person, not to place or time, falls asleep during exam and history taking intermittently. Attempts to attend to details. No facial asymmetry. Mucous membranes dry. Cranial nerves intact. No carotid bruits. No cervical pain. No cervical adenopathy. S1 and S2 regular with systolic murmur. Pulmonary exam without rales rhonchi or wheezes. Abdomen without masses tenderness organomegaly. Periphery without edema. Strength symmetry cannot be adequately assessed due to lack of ability of patient to participate. No hand drift.    Impression and plan:   acute encephalopathy, no evidence of infection, recent laboratory screening  studies stable including chronic renal insufficiency stage III, unknown history of potential head trauma with fall, CT head not performed during initial hospitalization, patient to be transported to emergency room, will require screening studies including CT scan of head metabolic studies and urine culture.   Recent rhabdomyolysis without evidence of end organ damage.   Chronic hypertension, blood pressure remains adequately controlled.   Prostate cancer metastatic to bone, no active low back pain.   Significant DJD of bilateral knees, no evidence of acute arthropathy, multiple abrasions without evidence of secondary infection.   Issues reviewed with patient nursing staff, arrangements made for transfer to emergency room, multiple complex medical issues reviewed.      Electronically signed by: Lorenza Sanchez MD

## 2021-06-21 NOTE — PROGRESS NOTES
"[facility]      Facility:    [facility] [[id number]]  Code Status: DNR and POLST AVAILABLE      Chief Complaint/Reason for Visit:  Chief Complaint   Patient presents with     H & P     Review Of Multiple Medical Conditions     physical deconditioning, s/p fall and hospitalization, abrasions, insomnia       HPI:   Maximo \"Avel\" Merline is a 77 y.o. male who presents for admission to [facility] following a hospitalization for a fall where he was found down at his house for approximately two days. According to admission records from the hospital and per patient, he fell last Wednesday Oct 31st, and was unable to get himself up. He did not hit his head when he fell.  He crawled along like an \"inchworm\" until a physical therapist came to check on him after he missed an appt. He was brought to the hospital via ambulance. His was hospitalized from 11/2/18 - 11/5/18.     Today, Avel is being evaluated for a review of multiple medical problems. He is sitting up in his chair and very friendly. He states he is doing much better. Avel lives at his own house, alone. All of his family lives out of state. He further explained his situation that he had been falling frequently in the week prior to his hospitalization. His knees would be in so much pain that he would collapse. When he falls, he tries not to fall on his R shoulder or use his R arm as he just had that shoulder replaced and recovered in April. That is why he says his L face and body got the abrasions and bruises.  Also, the abrasions on his body are mostly due to carpet burns from crawling along his floor. He is doing well with therapy. He did endorse having sleeping trouble at the TCU. We discussed his code status and Avel confirmed that he is DNR and his POLST reflects those wishes. He states that he sees his orthopedic surgeon for his arthritis in his shoulders and knees. He sees a cardiologist for his HTN. He sees " his oncologist for treatment for his prostate cancer. He denies any other concerns including fevers/chills, cough or cold symptoms, headaches, vision changes, chest pain/pressure, difficulty breathing, SOB, abdominal pain, nausea, vomiting, diarrhea, dysuria, increasing weakness, increasing pain.     Avel was slightly confused today, unsure of the day and month and where he was. He mentioned that he should have gotten Life Alert 6 months ago and he should have someone help him with laundry in the basement. Nursing note that he is in need of pain medications, using tylenol 1-2x per day and tramadol 1-2x per day. Also the nurses noted that he has had some mild itching around the scabs from his fall. Nursing did suggest that patient may have some confusion or fortgetfulness.     Past Medical History:  Past Medical History:   Diagnosis Date     Anemia      Arthritis     right shoulder region     Blockage of coronary artery of heart (H)      Calcific tendinitis     right shoulder     Cancer (H)      Chronic pain     both shoulders     HTN (hypertension)      Hypothyroidism      Osteoarthritis     right and left glenohumeral joint     Prostate cancer (H) 07/18/2017    metastatic to bone     Raynaud disease      Rotator cuff tear     complete right shoulder           Surgical History:  Past Surgical History:   Procedure Laterality Date     CYST REMOVAL       INGUINAL HERNIA REPAIR       REVERSE TOTAL SHOULDER ARTHROPLASTY  04/25/2018     supraglottic-LMA  04/25/2018     TONSILLECTOMY         Family History:   No family history on file.    Social History:    Social History     Social History     Marital status: Single     Spouse name: N/A     Number of children: N/A     Years of education: N/A     Social History Main Topics     Smoking status: Never Smoker     Smokeless tobacco: Never Used     Alcohol use Yes      Comment: 1-2 glasses of wine per day     Drug use: Not on file     Sexual activity: Not on file     Other  Topics Concern     Not on file     Social History Narrative          Review of Systems   See HPI.     Vitals:    11/07/18 1405   BP: 127/70   Pulse: 68   Resp: 18   Temp: 99  F (37.2  C)   SpO2: 95%   Weight: 206 lb (93.4 kg)       Physical Exam   General appearance: alert, appears stated age and cooperative  HEENT: Head is normocephalic with normal hair distribution. No evidence of trauma. Ears: Without lesions or deformity. No acute purulent discharge. Eyes: Conjunctivae pink with no scleral icterus or erythema. Nose: Normal mucosa and septum. Oropharnyx: mmm, no lesions present.  Lungs: clear to auscultation bilaterally, respirations without effort  Heart: regular rate and rhythm, S1, S2 normal, no murmur, click, rub or gallop  Abdomen: soft, non-tender; bowel sounds normal; no masses,  no organomegaly  Extremities: extremities normal, atraumatic, no cyanosis or edema  Pulses: 2+ and symmetric  Skin: Skin color, texture, turgor normal. No rashes or lesions. Multiple abrasions and scabs across body: L side of forehead, left abdomen, bilateral knees. No signs of erythema or drainage, most are scabbing over and beginning to heal.   Neurologic: Grossly normal   Psych: interacts well with caregivers, exhibits logical thought processes and connections, pleasant      Medication List:  Current Outpatient Prescriptions   Medication Sig     aspirin 81 mg chewable tablet Chew 81 mg daily.     atenolol (TENORMIN) 25 MG tablet Take 25 mg by mouth daily.     atorvastatin (LIPITOR) 40 MG tablet Take 40 mg by mouth daily.     hydroCHLOROthiazide (HYDRODIURIL) 25 MG tablet Take 25 mg by mouth daily.     HYDROcodone-acetaminophen 5-325 mg per tablet Take 1 tablet by mouth every 4 (four) hours as needed for pain.     HYDROcodone-acetaminophen 5-325 mg per tablet Take 2 tablets by mouth every 4 (four) hours as needed for pain.     levothyroxine (SYNTHROID, LEVOTHROID) 150 MCG tablet Take 150 mcg by mouth daily.     multivitamin  therapeutic tablet Take 1 tablet by mouth daily.     NIFEdipine (PROCARDIA XL) 30 MG 24 hr tablet Take 30 mg by mouth daily.     senna-docusate (PERICOLACE) 8.6-50 mg tablet Take 2 tablets by mouth 3 (three) times a day.     terbinafine HCl (LAMISIL) 250 mg tablet Take 250 mg by mouth daily.       Labs:  No labs today    Assessment:    ICD-10-CM    1. Physical deconditioning R53.81    2. Frequent falls R29.6    3. Abrasions of multiple sites T07.XXXA    4. Prostate cancer metastatic to bone (H) C61     C79.51    5. Essential hypertension I10    6. Osteoarthritis of both knees, unspecified osteoarthritis type M17.0    7. Osteoarthritis of left shoulder, unspecified osteoarthritis type M19.012        Plan:  Physical Deconditioning - acute, stable  Osteoarthritis of Knees and Shoulder - chronic, stable  Frequent Falls - acute, unstable  -Continue PT/OT and other therapies as per care plan.  -Encouraged good nutrition and movement habits.   -Discussed care plan and expected course of stay.   -Educated patient regarding fall risk and using call-light before trying to get up.   -Continue to follow-up per routine schedule or sooner if needed.     Abrasions of multiple sites - acute, stable  -No signs of infection like redness, warmth, or drainage. Beginning to scab and heal.  -Continue to cover knee abrasions to prevent further injury  -Monitor for signs of infection and healing  -Vistaril should help with itching.   -Continue to monitor per routine, sooner if concerns develop.     Hypertension  -BP stable in TCU  - HCTZ 25 mg by mouth daily.   -Atenolol 25 mg by mouth daily.   -Losartan 25 mg by daily.  -Nifedipine ER 1 tab by mouth daily.   -Continue to monitor vitals per routine.   -Will monitor per routine, sooner is concerns develop.     Metastatic Prostate Cancer - chronic, stable  -Per patient, he sees his oncologist every 3 months for treatment, his PSA levels are tested and he is given treatment to inhibit the  cancer cells  -Continue current treatment per oncology's recommendation    Insomnia - acute, stable  -Start Vistaril at bedtime to help with sleep (and itching r/t abrasions).   -Will continue to monitor per routine.     Pain Management - acute, stable  -Per patient, the tylenol and tramadol have been helping with the pain in his knees and shoulder  -Continue treatment plan, icing and elevating between PT/OT sessions.   -Tramadol 50 mg by mouth every 6 hours as needed.   -Will monitor per routine, sooner is concerns develop.     Otherwise continue current care plan for all other chronic medical conditions, as they are stable. Encouraged patient to engage in healthy lifestyle behaviors such as engaging in social activities, exercising (PT/OT), eating well, and following care plan. Follow up for routine check-up, or sooner if needed. Will continue to monitor patient and work with nursing staff collaboratively to work toward positive patient outcomes.    Total time of 35 minutes spent with patient and nursing staff with greater than 50% of this time spent on review of previous records, counseling, education, and discussion of the above care plan with nursing staff and patient. Advanced care planning discussed from 12:30 pm to 12:46 pm including POLST and code status. POLST signed.     Patient evaluated in conjunction with Jerome Butt NP, who scribed note. Amberly Bynum CNP, then edited note. Plan agreed upon by patient, student nurse practitioner and nurse practitioner.     Electronically signed by: jerome Dooley NP and Amberly Bynum CNP

## 2021-06-21 NOTE — PROGRESS NOTES
[facility]    Facility:   [facility] [[id number]]   Code Status: POLST AVAILABLE  Reevaluation of 77-year-old male found down in home setting with rhabdomyolysis, down for unknown period of time, admitted to hospital and hydrated, no end organ damage, transferred to TCU for rehabilitation, multiple abrasions to knees forearms elbows and chest, history of DJD, metastatic prostate cancer.    Review of systems: continues to feel confused. Believes he may be more confused than he was previously. Denies focal neurologic deficits. Continued aching discomfort bilateral thighs and knees, does not desire to trial off statin. Denies chest pain or palpitations. Highly frustrated with lack of progress. Weakness present at all times. 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 No hallucinations. Remainder of 12 point review of systems obtained negative.    Exam: oriented times two, semi upright in bed, somewhat vague in presentation. Blood pressure 118/64, 02 96%, pulse 62, respiratory 18, temperature 98.2, weight 206. No facial asymmetry. PERRLA EOMI. No carotid bruits or HJR. No pharyngeal erythema. S1 and S2 regular. Pulmonary exam without rales or wheezes. Abdomen without masses or tenderness. Periphery with trace nonpitting edema. Venous stasis changes present. No calf tenderness or swelling. DJD changes bilateral knees with bony enlargement. Multiple abrasions knees elbows without evidence of secondary infection.    Potassium 3.9    Impression and plan:   metastatic prostate cancer, no acute bony pain.   Rhabdomyolysis without end organ damage, continued aching discomfort anterior thighs, patient declines trial off statin.   Dementia suspected, cognitive testing to be performed, acute encephalopathy post recent fall, continued confusion.   Recent hypokalemia on potassium supplement, potassium level satisfactory.    Hypertension with adequate control of blood pressure.   DJD of multiple joints without acute inflammatory joint change.   Deconditioning with ongoing need for rehabilitation.   Concerns reviewed with patient and nursing staff.        Electronically signed by: Lorenza Sanchez MD

## 2021-06-21 NOTE — PROGRESS NOTES
"[facility]      Facility:    [facility] [[id number]]  Code Status: DNR and POLST AVAILABLE      Chief Complaint/Reason for Visit:  Chief Complaint   Patient presents with     Review Of Multiple Medical Conditions     physical deconditioning, s/p fall and hospitalization, abrasions, OA of shoulder, knees       HPI:   Maximo \"Avel\" Merline is a 77 y.o. male who [facility] following a hospitalization for a fall where he was found down at his house for approximately two days. According to admission records from the hospital and per patient, he fell last Wednesday Oct 31st, and was unable to get himself up. He did not hit his head when he fell.  He crawled along like an \"inchworm\" until a physical therapist came to check on him after he missed an appt. He was brought to the hospital via ambulance. His was hospitalized from 11/2/18 - 11/5/18.     Today, Avel is being evaluated for a review of multiple medical problems. He is sitting up in his wheelchair, and has a care conference scheduled today at 1pm. He wants to know when he can go home, if he's getting well enough to go home. Avel lives at his own house, alone, his family is out of state, and he is worried about his cat. Other than that, he is doing well with therapy. He did endorse occasional pain in his knees d/t OA and the tramadol helps. His constipation is resolved this week, he states the meds are working and he has been regular. Also, his headaches are better, still getting one every once in a while, across his front of his head from ear to ear, feels achy and constant, the tylenol makes his headache better. Relaxing and laying down also helps his headaches. He also reports his sleep is improving since last week. He is happy that his skin abrasions are healing and most of his scabs have fallen off. He denies any other concerns including fevers/chills, cough or cold symptoms, vision " changes, chest pain/pressure, difficulty breathing, SOB, abdominal pain, nausea, vomiting, diarrhea, dysuria, increasing weakness, increasing pain.     Avel continues to be confused at times, believing it to be March 2018. He continues to work with OT and speech. Nursing also said that Avel has been requiring the EZ Stand to transfer, which is not his baseline.     Past Medical History:  Past Medical History:   Diagnosis Date     Anemia      Arthritis     right shoulder region     Blockage of coronary artery of heart (H)      Calcific tendinitis     right shoulder     Cancer (H)     prostate     Chronic ischemic heart disease      Chronic pain     both shoulders     Coronary atherosclerosis      Gastric reflux      HLD (hyperlipidemia)      HTN (hypertension)      Hypothyroidism      Osteoarthritis     right and left glenohumeral joint     Prostate cancer (H) 07/18/2017    metastatic to bone     Prostate cancer metastatic to bone (H)      Raynaud disease      Rhabdomyolysis      Rotator cuff tear     complete right shoulder     Senile cataract      Thyroiditis      Vitreous degeneration            Surgical History:  Past Surgical History:   Procedure Laterality Date     CYST REMOVAL  1983    foot      INGUINAL HERNIA REPAIR  07/30/2007     REVERSE TOTAL SHOULDER ARTHROPLASTY  04/25/2018     supraglottic-LMA  04/25/2018     TONSILLECTOMY         Family History:   Family History   Problem Relation Age of Onset     Hypertension Mother      Coronary artery disease Father      Hypertension Father      Coronary artery disease Brother      Hypertension Brother        Social History:    Social History     Socioeconomic History     Marital status: Single     Spouse name: Not on file     Number of children: Not on file     Years of education: Not on file     Highest education level: Not on file   Social Needs     Financial resource strain: Not on file     Food insecurity - worry: Not on file     Food insecurity - inability:  Not on file     Transportation needs - medical: Not on file     Transportation needs - non-medical: Not on file   Occupational History     Not on file   Tobacco Use     Smoking status: Never Smoker     Smokeless tobacco: Never Used   Substance and Sexual Activity     Alcohol use: Yes     Alcohol/week: 4.2 - 8.4 oz     Types: 7 - 14 Standard drinks or equivalent per week     Drug use: Not on file     Sexual activity: Not on file   Other Topics Concern     Not on file   Social History Narrative     Not on file          Review of Systems   See HPI.     Vitals:    11/21/18 1833   BP: 112/68   Pulse: 62   Resp: 20   Temp: 97.4  F (36.3  C)   SpO2: 93%   Weight: 206 lb (93.4 kg)       Physical Exam   General appearance: alert, appears stated age and cooperative  HEENT: Head is normocephalic with normal hair distribution. No evidence of trauma. Ears: Without lesions or deformity. No acute purulent discharge. Eyes: Conjunctivae pink with no scleral icterus or erythema. Nose: Normal mucosa and septum. Oropharnyx: mmm, no lesions present.  Lungs: clear to auscultation bilaterally, respirations without effort  Heart: regular rate and rhythm, S1, S2 normal, no murmur, click, rub or gallop  Abdomen: soft, non-tender; bowel sounds normal; no masses,  no organomegaly  Extremities: extremities normal, atraumatic, no cyanosis or edema  Pulses: 2+ and symmetric  Skin: Skin color, texture, turgor normal. No rashes or lesions. Multiple abrasions and scabs across body, all healing: L side of forehead scab, left abdomen, bilateral knees. No signs of erythema or drainage, several scabs have fallen off and intact, pink skin over wounds now.   Neurologic: Grossly normal   Psych: interacts well with caregivers, exhibits logical thought processes and connections, pleasant      Medication List:  Current Outpatient Medications   Medication Sig     acetaminophen (TYLENOL) 325 MG tablet Take 650 mg by mouth every 4 (four) hours as needed for  pain.     aspirin 81 mg chewable tablet Chew 81 mg daily.     atenolol (TENORMIN) 25 MG tablet Take 25 mg by mouth daily.     atorvastatin (LIPITOR) 40 MG tablet Take 80 mg by mouth daily.      bicalutamide (CASODEX) 50 MG tablet Take 50 mg by mouth daily.     co-enzyme Q-10 50 mg capsule Take 50 mg by mouth daily.     hydroCHLOROthiazide (HYDRODIURIL) 25 MG tablet Take 25 mg by mouth daily.     krill-om-3-dha-epa-phospho-ast (MEGARED OMEGA-3 KRILL OIL) 268-43-27-50 mg cap Take 1 capsule by mouth 2 (two) times a day.     levothyroxine (SYNTHROID, LEVOTHROID) 150 MCG tablet Take 150 mcg by mouth daily.     losartan (COZAAR) 25 MG tablet Take 12.5 mg by mouth daily.     melatonin 3 mg Tab tablet Take 3 mg by mouth at bedtime as needed.     menthol-zinc oxide (CALMOSEPTINE) 0.44-20.6 % Oint ointment Apply 1 application topically 2 (two) times a day. Apply to scabs     multivitamin therapeutic tablet Take 1 tablet by mouth daily.     naproxen sodium (ALEVE) 220 MG tablet Take 220 mg by mouth 2 (two) times a day as needed for pain.     NIFEdipine (PROCARDIA XL) 30 MG 24 hr tablet Take 30 mg by mouth daily.     polyethylene glycol (MIRALAX) 17 gram packet Take 17 g by mouth daily as needed.     senna-docusate (PERICOLACE) 8.6-50 mg tablet Take 2 tablets by mouth 2 (two) times a day as needed.      traMADol (ULTRAM) 50 mg tablet Take 50 mg by mouth every 6 (six) hours as needed for pain.       Labs:  No labs today    Assessment:    ICD-10-CM    1. Physical deconditioning R53.81    2. Osteoarthritis of both knees, unspecified osteoarthritis type M17.0    3. Prostate cancer metastatic to bone (H) C61     C79.51    4. Abrasions of multiple sites T07.XXXA    5. Osteoarthritis of left shoulder, unspecified osteoarthritis type M19.012    6. Insomnia G47.00    7. Constipation, unspecified constipation type K59.00    8. Essential hypertension I10        Plan:  Physical Deconditioning - acute, stable  Osteoarthritis of Knees and  Shoulder - chronic, stable  Frequent Falls - acute, unstable  -Continue PT/OT and other therapies as per care plan.  -Encouraged good nutrition and movement habits.   -Discussed care plan and expected course of stay.   -Educated patient regarding fall risk and using call-light before trying to get up.   -Continue to follow-up per routine schedule or sooner if needed.     Abrasions of multiple sites - acute, stable  -No signs of infection like redness, warmth, or drainage. Beginning to scab and heal.  -Continue to cover knee abrasions to prevent further injury  -Monitor for signs of infection and healing  -Vistaril should help with itching.   -Continue to monitor per routine, sooner if concerns develop.     Hypertension - chronic, stable  -BP stable in TCU  -HCTZ 25 mg by mouth daily.   -Atenolol 25 mg by mouth daily.   -Losartan 25 mg by daily.  -Nifedipine ER 1 tab by mouth daily.   -Continue to monitor vitals per routine.   -Will monitor per routine, sooner if concerns develop.     Metastatic Prostate Cancer - chronic, stable  -Per patient, he sees his oncologist every 3 months for treatment, his PSA levels are tested and he is given treatment to inhibit the cancer cells  -Continue current treatment per oncology's recommendation    Insomnia - acute, stable  -Start Vistaril at bedtime to help with sleep (and itching r/t abrasions).   -Will continue to monitor per routine.     Pain Management - acute, stable  -Per patient, the tylenol and tramadol have been helping with the pain in his knees and shoulder  -Continue treatment plan, icing and elevating between PT/OT sessions.   -Tramadol 50 mg by mouth every 6 hours as needed.   -Will monitor per routine, sooner if concerns develop.     Constipation - acute, stable  -Continue current treatment of Senna-S two tablets PO two times a day, miralax 17mg PO BID  -Monitor for loose stools  -Will reevaluate medication needs as bowel activity normalizes  -Will follow-up per  routine, sooner if concerns develop.     Otherwise continue current care plan for all other chronic medical conditions, as they are stable. Encouraged patient to engage in healthy lifestyle behaviors such as engaging in social activities, exercising (PT/OT), eating well, and following care plan. Follow up for routine check-up, or sooner if needed. Will continue to monitor patient and work with nursing staff collaboratively to work toward positive patient outcomes.    Total time of 25 minutes spent with patient and nursing staff with greater than 50% of this time spent on review of previous records, counseling, education, and discussion of the above care plan with nursing staff and patient.     Patient evaluated in conjunction with Jerome Butt NP, who scribed note. Amberly Bynum CNP, then edited note. Plan agreed upon by patient, student nurse practitioner and nurse practitioner.     Electronically signed by: jerome Butt NP and Amberly Bynum CNP

## 2021-06-22 NOTE — PROGRESS NOTES
[facility]    Facility:   [facility] [[id number]]   Code Status: POLST AVAILABLE      Reassessment of 78-year-old male hypertension, DJD of multiple joints, recently found down with rhabdomyolysis, known prostate cancer metastatic to bone, was recuperating in TCU post rhabdomyolysis, readmitted to hospital with increasing conflict confusion/encephalopathy, found to have cerebellar mass which was resected, fourth ventricle effacement with hydrocephalus, post operatively transferred back to TCU for ongoing management.    Review of systems: aware of persistent confusion. Dull headache frontal. Denies seizure activity. No focal neurologic deficits. Denies cardiac or pulmonary symptoms. Vague pain bilateral knees. [place] dizziness. No visual changes. No active urinary symptoms. Remainder of 12 point review of systems obtained negative.    Nursing staff report patient with increasing confusion, disorientation, this is not observed during clinical exam today, nursing staff request that transfer to hospital be made should there be increasing agitation and confusion during the nighttime.    Exam: vital signs reviewed including afebrile status. Patient highly conversant, oriented to time place and person, recalls previous conversations with accuracy. Highly sociable. Mild vagueness in presentation. No facial asymmetry. Scalp staples without inflammation. No cervical/nuchal rigidity. No HJR. S1 and S2 regular. Pulmonary exam without rales rhonchi or wheezes. DJD changes knees, nonpitting edema bilateral lower extremities. Strength and muscle tone diminished lower extremities. No hand drift.    Impression and plan: status post cerebellar mass resection, ongoing mild confusion, cognitively improved, escalating agitation and increased confusion at HS per nursing staff. Metastatic to bone prostate cancer, no significant pain. Hypertension with adequate control. Deconditioning with  need for rehabilitation. Multiple concerns are reviewed with patient and nursing staff, continue cautious observation of neurologic status.    Electronically signed by: Lorenza Sanchez MD

## 2021-06-22 NOTE — PROGRESS NOTES
[facility]    Facility:   [facility] [[id number]]   Code Status: POLST AVAILABLE  Reassessment of 78-year-old male with recent multiple falls, hospitalization for rhabdomyolysis, transferred to TCU, readmission to hospital with increasing confusion, found to have hydrocephalus and cerebellar mass, underwent drain and removal mass, postoperatively with confusion, on Decadron taper, remaining frontal mass, followed by oncology chemotherapy for prostate cancer, continues in TCU post hospitalization for rehabilitation.    Review of systems: patient was to be reevaluated by neuro- surgery today, no transportation was planned, no one to accompany patient therefore did not see neurosurgeon. Patient with increasing confusion per nursing staff,  particularly noted at HS. Moderate paranoia, energy level increased and decreased sleep pattern. Patient has no recall of hospitalization, has multiple concerns regarding issues about facility, suspicious regarding those who are caring for patient, states he can trust few people, refers to physician as Zulema, repeatedly states that Zulema ( this physician ) told him he was 20 years old, he can otherwise trust this physician with the exception of this statement. Unaware of dizziness. No visual impairment. Previous knee discomfort resolving. Energy level diminished. No cardiac or pulmonary symptoms. Remainder of 12 point review of systems obtained negative.    Exam: vital signs reviewed over past four days. Alert and oriented times one, suspicions expressed, at other times highly cooperative, high baseline intellectual functioning. Surgical wound posterior cerebellar is covered, no surrounding erythema. No facial asymmetry. Pupils equal round and reactive. Mucous membranes moist. No credit bruits or HJR. S1 and S2 regular with 1/6 systolic murmur. Pulmonary exam without rales rhonchi or wheezes. Abdomen without masses tenderness  organomegaly. No hand drift or tremor. Strength symmetrical upper extremities. Previous abrasions lower extremities resolved, no surrounding erythema. Bony enlargement of knees, no focal tenderness. Pedal pulses minus one and symmetrical.    Impression and plan: status post resection of metastatic cerebellar lesion, recent hydrocephalus, missed neurosurgery follow-up today, neurosurgeon to be informed. Confusion continues, paranoia increasing, suspect paranoia was present at baseline. Known dementia in addition to recent findings of metastatic brain lesions. Hypertension on three antihypertensives with adequate control of blood pressure. Recent multiple falls, recent rhabdomyolysis, no end organ damage. Bone metastases multiple, no pain related to metastatic process. Multiple concerns reviewed with patient and nursing staff.        Electronically signed by: Lorenza Sanchez MD

## 2021-06-22 NOTE — PROGRESS NOTES
[facility]    Facility:   [facility] [[id number]]   Code Status: POLST AVAILABLE    Reassessment of 78-year-old male with widely metastatic prostate cancer, recent resection of cerebellar mass and drain of hydrocephalus, known multiple bone metastases without pain, continues in TCU for rehabilitation and management of medical problems which include hypertension, dementia. Recent initiation of lidocaine with nitroglycerin topical for highly symptomatic anal fissure.    Review of systems systems: continues with symptomatic rectal discomfort, finds topical application of gel to be beneficial, requests BID application. Mild dysuria new in onset. Denies chest pain or palpitations. No seizure activity or focal neurologic deficits. No fever sweats or chills. Decreased energy level. Remainder of 12 point review of systems obtained negative.    Exam: vital signs reviewed over past 72 hours. Lying in bed, cooperative, pleasant, oriented times two. No facial asymmetry. No pharyngeal erythema. No cervical adenopathy. S1 and S2 regular. Pulmonary exam without rales rhonchi or wheezes. Abdomen without masses or tenderness, normal active bowl sounds. Periphery without edema. Degenerative changes of knees and digits without acute inflammatory change.    Impression and plan:   hypertension, satisfactory control of blood pressure.   Symptomatic anal fissure, loose stooling 72 hours ago has resolved, continue senna S2 tablets b.i.d. with MiraLAX and metamucil. Lidocaine with nitroglycerin topical gel increased to BID dosing for anal fissure.   New onset dysuria, urine culture ordered.   Dementia, cognition remains stable. Previous encephalopathy resolved.   Metastases to bone without pain.   Multiple concerns are reviewed, discussion with patient and nursing staff.      Electronically signed by: Lorenza Sanchez MD

## 2021-06-22 NOTE — PROGRESS NOTES
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    Review of systems: discussion with patient and with nephew in attendance. Aware of mild confusion. Denies hallucinations. Is willing to have nephew as power of . Has seen oncologist today, understands further intervention including potential brain radiation are being considered, understands that he has metastatic cancer to the brain. Denies cardiac or pulmonary symptoms. No active joint pain or back pain. Minimal pain anally, continues b.i.d. lidocaine with nitroglycerin preparation. Denies orthostatic symptoms. Aware that blood pressure was diminished at oncology appointment today. Remainder of 12 point review of systems obtained negative.    Exam: systolic blood pressure 96, recent vital signs reviewed. In bed, cooperative and pleasant, oriented times two. No facial asymmetry. No muscular fasciculations or tremor. Mucous membranes moist. No HJR. S1 and S2 regular. Pulmonary exam without rales rhonchi or wheezes. Abdomen without masses tenderness organomegaly. Periphery without edema. Scabbed tip left great toe evidence of denuding, erythema without skin breakdown tip of right first and second toe. Pedal pulses -2 and symmetrical. Decreased strength bilateral feet. Questionable early foot drop.    Impression and plan:   metastatic prostate cancer, known pelvic lumbar sacral metastases, recent findings of cerebellar mass post resection, hydrocephalus drained, remaining brain lesions, oncology issues reviewed  post appointment today, will have repeat MRI of  head, CT scan of chest and pelvis, upcoming appointment with neuro- radiology.   Dementia, impaired cognition, nephew willing to assume POA, this reviewed with nephew and patient.   Hypertension, relatively low blood pressure recordings, no orthostatic changes, should  decrease pressures remain lower antihypertensives.   Lesion left great toe, irritation bilateral toe tips, Silvadene to area, monitor foot status.   Progressive decline in strength, rehabilitation indicated, should be up and out of bed.   DJD of multiple joints without acute inflammatory change.   Total time of this evaluation greater than 35 minutes, greater than 20 minutes spent in face-to-face contact with nephew discussion regarding patient's clinical status and long-term prognosis, prolonged discussion with patient regarding medical status, discussion with nursing staff.      Electronically signed by: Lorenza Sanchez MD

## 2021-06-22 NOTE — PROGRESS NOTES
[facility]    Facility:   [facility] [place] [facility] [[id number]]   Code Status: POLST AVAILABLE  Readmission evaluation to TCU of 78-year-old male. History is taken from patient who gives a limited history and from accompanying [facility] hospital notes. 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    Past medical history, current medical problem list, drug allergies, current medication list, social history, family history, DNAR status, okay with intubation reviewed in epic.    Review of systems: aware of confusion, no headache, does not recall details of hospitalization. Continued vague knee discomfort, strength gradually improving. Low-grade frontal headache, no visual impairment. Denies chest pain or palpitations. Generalized weakness present. No seizure activity. Unaware of current urinary symptoms. Remainder of 12 point review of systems obtained negative.    Exam: alert, oriented times two, sitting in chair, vague in presentation. Blood  pressure 119/74, 02 95%, pulse 57, respiratory 18, temperature 97.9, repeat pulse 54. Staples scalp. No facial asymmetry. PERRLA EOMI. Mucous membranes moist. Thyroid midline regular without nodularity. No carotid bruits or HJR. S1 and S2 regular. Pulmonary exam without rales rhonchi or wheezes. Abdomen without masses tenderness organomegaly. Limited range of motion right shoulder, no focal tenderness. Previous abrasions bilateral knees and forearms healed. Bilateral knees with DJD changes, no effusion. No calf tenderness or swelling. Pedal pulses minus one and symmetrical. No hand drift. Strength symmetrical upper and lower extremities and diminished.    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   Hypertension on losartan metoprolol and Procardia with satisfactory control of blood pressure, previously on  atenolol, pulse diminished, decrease metoprolol to one half dose, continue to observe pulse   Recent rhabdomyolysis without indication of end organ damage.   Hypothyroidism on replacement.   Deconditioning with need for rehabilitation.   DJD of knees with pain, pain adequately controlled at present.   Total time of admission evaluation greater than 45 minutes, greater than 50% of time spent in coordination of care and counseling, outside medical  records reviewed, medications reviewed, multiple patient concerns reviewed.        Electronically signed by: Lorenza Sanchez MD

## 2021-06-22 NOTE — PROGRESS NOTES
[facility]    Facility:   [facility] [[id number]]   Code Status: POLST AVAILABLE    Reassessment of 78-year-old male with recent hospitalization for rhabdomyolysis, found down in home setting after 48 hours, treated successfully with IV hydration, transferred to TCU, readmitted to hospital with decreased mentation, diagnosed with hydrocephalus and cerebellar mass which was resected, transferred back to TCU for ongoing rehabilitation and management of medical problems which include additional frontal mass.    Review of systems: denies confusion, believes however thinking is not as sharp as it once was. Continues to express concerns regarding issues about facility, continues to detail particulars of these concerns ( none of which are rational ). Denies new focal neurologic deficits. No seizure activity. Vague headache frontal intermittent. Generalized aching of bilateral knees slowly improving. Generalized weakness remains present. Family members have been in town. Chief complaint is of rectal discomfort which occurs post evacuation. Remainder of 12 point review of systems obtained negative.    Exam: oriented times two, highly conversant, in bed. Rational to large extent in discussion. Blood pressure 104/62, 02 90%, pulse 73, respiratory 16, temperature 97.8. No facial asymmetry. Extraocular movements intact. No pharyngeal erythema. No HJR. S1 and S2 regular. Pulmonary exam without rales rhonchi or wheezes. Degenerative changes bilateral knees without acute inflammatory change. Abdomen without masses or tenderness. Periphery with trace nonpitting edema. Pedal pulses symmetrical.    Impression and plan: hypertension with adequate control of blood pressure. Rectal pain consistent with anal fissure, Anusol HC suppository 1Q day post evacuation. Constipation, on MiraLAX 17 g Q day, senna S2Q day increased from PRN, add Metamucil 1 teaspoon Q day in view of fissure.  Continued confusion, family has been in town, per nursing staff memory care is being considered. Metastatic prostate cancer with known bone metastases, no pain related to bone metastases, recent resection cerebellar mass and drain for hydrocephalus, remaining frontal mass, no seizure activity noted. Multiple concerns reviewed with patient, additional discussion with nursing staff.      Electronically signed by: Lorenza Sanchez MD

## 2021-06-22 NOTE — PROGRESS NOTES
[facility]    Facility:   [facility] [[id number]]   Code Status: POLST AVAILABLE  Reassessment of 78-year-old male with metastatic prostate cancer, multiple bone metastases without pain, originally admitted to hospital with rhabdomyolysis, had been down for several days, extensive abrasions to knees and forearms, readmitted to hospital with increasing confusion, found to have hydrocephalus and cerebellar as well as frontal mass, cerebellar mass resected,  hydrocephalus drained, transferred back to TCU for ongoing rehabilitation.    Review of systems: denies confusion. Presents a list of concerns, desires to talk to his  and to the administration regarding issues such as who will shovel the walk if it snows, who will replace a window if it's broken etc. Denies headache. No dizziness. Desires to review his recent hospitalization, does not recall the hospitalization, expresses concern that a tumor was taken out without permission prior to procedure. Does not understand what procedure was performed. No active knee discomfort. No cardiac or pulmonary symptoms. Multiple personal concerns are expressed. Remainder of 12 point review of systems obtained negative. Nursing staff report patient up during the night, sleeps 2 to 3 hours, significant confusion, variably cognition is intact. Not easily redirected.    Exam: vital signs reviewed over past three days. Oriented times one, continues to refer to this physician as Zulema, highly conversant, eye contact excellent, presets a neatly written list of concerns. Humor intact. No facial asymmetry. No pharyngeal erythema. Extraocular movements intact. S1 and S2 regular with 2/6 systolic murmur. Pulmonary exam without rales rhonchi or wheezes. Abdomen without masses tenderness organomegaly. Periphery without edema. DJD changes knees without acute inflammatory change. No hand drift. Strength intact bilateral hand  grasp.    Impression and plan:   continued cognitive impairment, variably agitated, called 911 on two occasions over the weekend, was interrogated by police, he had multiple concerns regarding facility.   Decadron taper to be completed today, no focal neurologic deficits status post neurosurgery, transportation was not available for recent follow-up appointment, the particulars of follow-up appointment are not available to staff present at this time.   Hypertension with adequate control of blood pressure.   Escalating insomnia, increase melatonin to 6 mg QHS, steroid use likely impairing sleep.   DJD bilateral knees, no active pain. Recent rhabdomyolysis without end organ damage. Multiple concerns are reviewed, prolonged evaluation time, issues addressed with patient, discussion regarding recent surgery, current medical status, discussion with nursing staff.        Electronically signed by: Lorenza Sanchez MD

## 2021-06-22 NOTE — PROGRESS NOTES
"[facility]      Facility:    [facility] [[id number]]  Code Status: DNR and POLST AVAILABLE      Chief Complaint/Reason for Visit:  Chief Complaint   Patient presents with     Review Of Multiple Medical Conditions     physical deconditioning, falls, metastatic cancer, constipation       HPI:   Maximo \"Gene\" Merline is a 78 y.o. male who [facility] following a hospitalization for a fall where he was found down at his house for approximately two days. According to admission records from the hospital and per patient, he fell last Wednesday Oct 31st, and was unable to get himself up. He did not hit his head when he fell.  He crawled along like an \"inchworm\" until a physical therapist came to check on him after he missed an appt. He was brought to the hospital via ambulance. His was hospitalized from 11/2/18 - 11/5/18. 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    Today, Gene is being evaluated for a routine " review of multiple medical problems while in the TCU. Avel is doing well per his report. He is feeling much better than his last visit. He was constipated with some significant abdominal pain last week. He since had an enema and several bowel movements. Avel states he does not have any other concerns at this time. He is looking forward to going home per his report, however we did discuss that is most likely not feasible due to safety, etc. He denies fevers/chills, cough or cold symptoms, vision changes, chest pain/pressure, difficulty breathing, SOB, nausea, vomiting, diarrhea, dysuria, increasing weakness, increasing pain.     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)      Nuclear sclerosis      Obstructive hydrocephalus      Osteoarthritis     right and left glenohumeral joint     Primary osteoarthritis of both shoulders      Prostate cancer (H) 07/18/2017    metastatic to bone     Raynaud disease      Rhabdomyolysis      Rotator cuff tear     complete right shoulder     Senile cataract      Single vessel coronary artery disease      Thyroiditis      Vitreous degeneration      Vitreous opacities            Surgical History:  Past Surgical History:   Procedure Laterality Date     CYST REMOVAL  1983    foot      INGUINAL HERNIA REPAIR  07/30/2007     OTHER SURGICAL HISTORY  11/23/2018    Placement of a right frontal external ventricular drain      POSTERIOR FOSSA DECOMPRESSION  11/26/2018    craniectomy and resection of tumor     REVERSE TOTAL SHOULDER ARTHROPLASTY  04/25/2018     supraglottic-LMA  04/25/2018     TONSILLECTOMY         Family History:   Family History   Problem Relation Age of Onset     Hypertension Mother      Coronary artery disease Father      Hypertension Father      Coronary artery disease Brother      Hypertension Brother        Social History:    Social History     Socioeconomic History     Marital status: Single     Spouse name: None     Number of children: None     Years of education: None     Highest education level: None   Social Needs     Financial resource strain: None     Food insecurity - worry: None     Food insecurity - inability: None     Transportation needs - medical: None     Transportation needs - non-medical: None   Occupational History     None   Tobacco Use     Smoking status: Never Smoker     Smokeless tobacco: Never Used   Substance and Sexual Activity     Alcohol use: Yes     Alcohol/week: 4.2 - 8.4 oz     Types: 7 - 14 Standard drinks or equivalent per week     Drug use: No     Sexual activity: Not Currently   Other Topics Concern     None   Social History Narrative     None          Review of Systems   See HPI.     Vitals:    12/26/18 1347   BP: 119/80   Pulse: 97   Resp: 16   Temp: 97.8  F (36.6  C)   SpO2: 96%   Weight: 184 lb 6.4 oz (83.6 kg)       Physical Exam   General appearance: alert, appears stated age and cooperative  HEENT: Head is normocephalic with normal hair distribution. No evidence of trauma. Ears: Without lesions or deformity. No acute purulent discharge. Eyes: Conjunctivae pink with no scleral icterus or erythema. Nose: Normal mucosa and septum. Oropharnyx: mmm, no lesions present.  Lungs: clear to auscultation bilaterally, respirations without effort  Heart: regular rate and rhythm, S1, S2 normal, no murmur, click, rub or gallop  Abdomen: soft, nontender; bowel sounds normal; no masses, no  organomegaly  Extremities: extremities normal, atraumatic, no cyanosis or edema  Pulses: 2+ and symmetric  Skin: Skin color, texture, turgor normal. No rashes or lesions.    Neurologic: Grossly normal   Psych: interacts well with caregivers, exhibits logical thought processes and connections, pleasant      Medication List:  Current Outpatient Medications   Medication Sig     acetaminophen (TYLENOL) 500 MG tablet Take 1,000 mg by mouth 3 (three) times a day (max 4gm acetaminophen in 24/hr).           atenolol (TENORMIN) 25 MG tablet Take 25 mg by mouth daily .           atorvastatin (LIPITOR) 80 MG tablet Take 80 mg by mouth at bedtime .           bicalutamide (CASODEX) 50 MG tablet Take 50 mg by mouth daily.     lansoprazole (PREVACID) 30 MG capsule Take 30 mg by mouth 2 (two) times a day.     levothyroxine (SYNTHROID, LEVOTHROID) 150 MCG tablet Take 150 mcg by mouth daily on empty stomach.           losartan (COZAAR) 25 MG tablet Take 25 mg by mouth daily , hold if SBP <100.           melatonin 5 mg Tab tablet Take 5 mg by mouth at bedtime for insomnia.           multivitamin therapeutic tablet Take 1 tablet by mouth daily.     NIFEdipine (PROCARDIA XL) 30 MG 24 hr tablet Take 30 mg by mouth daily . Take before meal..           oxyCODONE (ROXICODONE) 5 MG immediate release tablet Take 5 mg by mouth every 8 (eight) hours as needed for pain (pain level 8-10).     polyethylene glycol (MIRALAX) 17 gram packet Take 17 g by mouth daily as needed.     senna-docusate (PERICOLACE) 8.6-50 mg tablet Take 2 tablets by mouth 2 (two) times a day as needed.        Labs:  No labs today    Assessment:    ICD-10-CM    1. Drug-induced constipation K59.03    2. Physical deconditioning R53.81    3. 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 Hypertension, unspecified type I10        Plan:  Physical Deconditioning  Osteoarthritis of Knees and Shoulder   Frequent Falls  -Oxycodone  5 mg by mouth every 4 hours for severe pain.   -Continue PT/OT and other therapies as per care plan.  -Encouraged good nutrition and movement habits.   -Discussed care plan and expected course of stay.   -Educated patient regarding fall risk and using call-light before trying to get up.   -Continue to follow-up per routine schedule or sooner if needed.     Hypertension - chronic, stable  -BP stable in TCU  -HCTZ 25 mg by mouth daily.   -Atenolol 25 mg by mouth daily.   -Losartan 25 mg by daily.  -Nifedipine ER 30 mg 1 tab by mouth daily.   -Continue to monitor vitals per routine.   -Will monitor per routine, sooner if concerns develop.     Metastatic Prostate Cancer - chronic, stable  -Oncology to follow.   -Continue current treatment per oncology's recommendation.    Constipation  -Continue current treatment of Senna-S two tablets by mouth two times a day  -Start Miralax 17mg by mouth daily.   -Metamucil Powder 1 time by mouth daily.   -Monitor for loose stools  -Will reevaluate medication needs as bowel activity normalizes  -Will follow-up per routine, sooner if concerns develop.     Otherwise continue current care plan for all other chronic medical conditions, as they are stable. Encouraged patient to engage in healthy lifestyle behaviors such as engaging in social activities, exercising (PT/OT), eating well, and following care plan. Follow up for routine check-up, or sooner if needed. Will continue to monitor patient and work with nursing staff collaboratively to work toward positive patient outcomes.    Electronically signed by: Amberly Bynum CNP

## 2021-06-22 NOTE — PROGRESS NOTES
"[facility]      Facility:    [facility] [[id number]]  Code Status: DNR and POLST AVAILABLE      Chief Complaint/Reason for Visit:  Chief Complaint   Patient presents with     Problem Visit     abdominal pain, constipation       HPI:   Maximo \"Avel\" Merline is a 78 y.o. male who [facility] hospitalization for a fall where he was found down at his house for approximately two days. According to admission records from the hospital and per patient, he fell last Wednesday Oct 31st, and was unable to get himself up. He did not hit his head when he fell.  He crawled along like an \"inchworm\" until a physical therapist came to check on him after he missed an appt. He was brought to the hospital via ambulance. His was hospitalized from 11/2/18 - 11/5/18.     Today, Avel is being evaluated for abdominal pain. He states he is also somewhat constipated. He has had intermittent bowel movements and it has been 4-5 days since his last BM. He states he feels uncomfortable. He says he has tried to have a bowel movement but he has only had very small, hard BM. Avel denies any other issues. Nursing staff state they have done a rectal check and there is stool there. Avel is agreeable to having an enema. He denies any other concerns including fevers/chills, cough or cold symptoms, vision changes, chest pain/pressure, difficulty breathing, SOB, nausea, vomiting, diarrhea, dysuria, increasing weakness, increasing pain.     Past Medical History:  Past Medical History:   Diagnosis Date     Anemia      Arthritis     right shoulder region     Blockage of coronary artery of heart (H)      Calcific tendinitis     right shoulder     Cancer (H)     prostate     Chronic ischemic heart disease      Chronic pain     both shoulders     Chronic pain of both shoulders      Coronary atherosclerosis      Dermatochalasis      Gastric reflux      HLD (hyperlipidemia)  "     HTN (hypertension)      Hypothyroidism      IT band syndrome      Meniere's disease in remission      Metastatic small cell carcinoma involving brain with unknown primary site (H)      Nuclear sclerosis      Obstructive hydrocephalus      Osteoarthritis     right and left glenohumeral joint     Primary osteoarthritis of both shoulders      Prostate cancer (H) 07/18/2017    metastatic to bone     Raynaud disease      Rhabdomyolysis      Rotator cuff tear     complete right shoulder     Senile cataract      Single vessel coronary artery disease      Thyroiditis      Vitreous degeneration      Vitreous opacities            Surgical History:  Past Surgical History:   Procedure Laterality Date     CYST REMOVAL  1983    foot      INGUINAL HERNIA REPAIR  07/30/2007     OTHER SURGICAL HISTORY  11/23/2018    Placement of a right frontal external ventricular drain     POSTERIOR FOSSA DECOMPRESSION  11/26/2018    craniectomy and resection of tumor     REVERSE TOTAL SHOULDER ARTHROPLASTY  04/25/2018     supraglottic-LMA  04/25/2018     TONSILLECTOMY         Family History:   Family History   Problem Relation Age of Onset     Hypertension Mother      Coronary artery disease Father      Hypertension Father      Coronary artery disease Brother      Hypertension Brother        Social History:    Social History     Socioeconomic History     Marital status: Single     Spouse name: None     Number of children: None     Years of education: None     Highest education level: None   Social Needs     Financial resource strain: None     Food insecurity - worry: None     Food insecurity - inability: None     Transportation needs - medical: None     Transportation needs - non-medical: None   Occupational History     None   Tobacco Use     Smoking status: Never Smoker     Smokeless tobacco: Never Used   Substance and Sexual Activity     Alcohol use: Yes     Alcohol/week: 4.2 - 8.4 oz     Types: 7 - 14 Standard drinks or equivalent per week      Drug use: No     Sexual activity: Not Currently   Other Topics Concern     None   Social History Narrative     None          Review of Systems   See HPI.     Vitals:    12/19/18 1751   BP: 104/62   Pulse: 73   Resp: 16   Temp: 97.8  F (36.6  C)   SpO2: 98%   Weight: 184 lb 6.4 oz (83.6 kg)       Physical Exam   General appearance: alert, appears stated age and cooperative  HEENT: Head is normocephalic with normal hair distribution. No evidence of trauma. Ears: Without lesions or deformity. No acute purulent discharge. Eyes: Conjunctivae pink with no scleral icterus or erythema. Nose: Normal mucosa and septum. Oropharnyx: mmm, no lesions present.  Lungs: clear to auscultation bilaterally, respirations without effort  Heart: regular rate and rhythm, S1, S2 normal, no murmur, click, rub or gallop  Abdomen: soft, tender throughout, worse in lower abdomen; bowel sounds normal; no masses, no organomegaly  Extremities: extremities normal, atraumatic, no cyanosis or edema  Pulses: 2+ and symmetric  Skin: Skin color, texture, turgor normal. No rashes or lesions.    Neurologic: Grossly normal   Psych: interacts well with caregivers, exhibits logical thought processes and connections, pleasant      Medication List:  Current Outpatient Medications   Medication Sig     acetaminophen (TYLENOL) 500 MG tablet Take 1,000 mg by mouth 3 (three) times a day (max 4gm acetaminophen in 24/hr).           atenolol (TENORMIN) 25 MG tablet Take 25 mg by mouth daily .           atorvastatin (LIPITOR) 80 MG tablet Take 80 mg by mouth at bedtime .           bicalutamide (CASODEX) 50 MG tablet Take 50 mg by mouth daily.     lansoprazole (PREVACID) 30 MG capsule Take 30 mg by mouth 2 (two) times a day.     levothyroxine (SYNTHROID, LEVOTHROID) 150 MCG tablet Take 150 mcg by mouth daily on empty stomach.           losartan (COZAAR) 25 MG tablet Take 25 mg by mouth daily , hold if SBP <100.           melatonin 5 mg Tab tablet Take 5 mg by mouth  at bedtime for insomnia.           multivitamin therapeutic tablet Take 1 tablet by mouth daily.     NIFEdipine (PROCARDIA XL) 30 MG 24 hr tablet Take 30 mg by mouth daily . Take before meal..           oxyCODONE (ROXICODONE) 5 MG immediate release tablet Take 5 mg by mouth every 8 (eight) hours as needed for pain (pain level 8-10).     polyethylene glycol (MIRALAX) 17 gram packet Take 17 g by mouth daily as needed.     senna-docusate (PERICOLACE) 8.6-50 mg tablet Take 2 tablets by mouth 2 (two) times a day as needed.        Labs:  No labs today    Assessment:    ICD-10-CM    1. Generalized abdominal pain R10.84    2. Drug-induced constipation K59.03        Plan:  Abdominal Pain likely due to Constipation  -Abdominal flat plate.   -Baxter diet, clear liquids until pain is resolved.  -If worsening pain send to ER.    Constipation  -Enema now.   -Miralax 17g mixed in 8 ounces of juice or water daily.   -Encouraged patient to increase fiber in diet, eat a lot of fruits and vegetables, increase water intake.  -Exercise as much as possible.   -Follow up as needed.     Otherwise continue current care plan for all other chronic medical conditions, as they are stable. Encouraged patient to engage in healthy lifestyle behaviors such as engaging in social activities, exercising (PT/OT), eating well, and following care plan. Follow up for routine check-up, or sooner if needed. Will continue to monitor patient and work with nursing staff collaboratively to work toward positive patient outcomes.    Electronically signed by: Amberly Bynum CNP

## 2021-06-22 NOTE — PROGRESS NOTES
[facility]    Facility:   [facility] [[id number]]   Code Status: POLST AVAILABLE    Reassessment of 78-year-old male with hypertension, DJD of multiple joints, mild dementia, prostate cancer metastatic bone, recent hospitalization for rhabdomyolysis, readmitted to hospital and found to have cerebellar mass, questionable variant of prostate cancer versus new unknown primary with metastases, underwent resection of mass, transferred back to TCU. Recent significant anal pain, decline in clinical status and strength.    Review of systems: no pain gradually improving. No loose stools. Sense of pressure rectally intermittent. Denies sweats or chills. Please cognition is improving. Per appears to be in bed throughout the day, states he is however participating in physical therapy. Denies active joint discomfort. No seizure activity. No focal neurologic deficits. Remainder of 12 point review of systems obtained negative.    Exam: vital signs reviewed over past four days. Alert, oriented times two, semi upright in bed, cooperative and highly conversant. No facial asymmetry. Pharynx without erythema. No cervical or supraclavicular adenopathy. Extraocular movements intact. S1 and S2 regular with 2/6 systolic murmur. Pulmonary exam without rales rhonchi or wheezes. No hand drift. Abdomen without masses tenderness organomegaly. Periphery with trace venous stasis changes. Decreased muscle tone distal lower extremities. DJD bilateral knees without acute inflammatory change.    Impression and plan:   metastatic prostate cancer, followed by oncology, recent cerebellar mass resection, acute confusion slowly resolving, paranoia less prominent, no evidence of hallucinations or delusions, no seizure activity, known remaining brain metastases.   Hypertension, relatively low blood pressure recordings, continue to monitor, no orthostatic changes.   Decline in clinical status with decreased  strength, reluctant to get out of bed, encouraged increased activity.   DJD bilateral knees not currently symptomatic.   Anal fissure, pain resolving with BID application of lidocaine with nitroglycerin. No active loose stools or constipation.   Multiple concerns reviewed with patient and nursing staff.      Electronically signed by: Lorenza Sanchez MD

## 2021-06-22 NOTE — PROGRESS NOTES
[facility]    Facility:   [facility] [[id number]]   Code Status: POLST AVAILABLE    Reassessment of 78-year-old male with recent resection of cerebellar mass, consistent with metastatic prostate cancer, drain for hydrocephalus, had previously been hospitalized for recurrent falls with rhabdomyolysis, cerebellar mass diagnosed post observation of increasing confusion. 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    Review of systems: is aware of ongoing mild confusion. Recent follow up with neuro- surgery, no new issues pending per his report, verbal report only from patient, written report is not immediately available. Denies focal neurologic deficits. No fever sweats or chills. No seizure activity. Denies anterior chest pain. Resolving rectal pain, constipation resolved. Generalized weakness present. Remainder of 12 point review of systems obtained negative.    Exam: oriented times two, cooperative, in no apparent distress. Blood pressure 125/87, temperature 98, pulse 89, 02 99%. Mucous membranes moist. No muscular fasiculations. No hand drift, unable to fully elevate right arm post shoulder surgery. S1 and S2 regular. Pulmonary exam without rales rhonchi or wheezes. Abdomen without masses or tenderness. Periphery with out edema. DJD changes bilateral knees. Strength lower extremities symmetrical.    Impression and plan: prostate cancer metastatic to bone and brain, status post resection cerebellar mass, continued cognitive deficit, mild improvement, no indication of seizure activity, continues followed by neuro- surgery and urology for prostate cancer. Hypertension with adequate control of blood pressure. Extensive DJD of knees, no current pain. Disposition, family considering placement in memory care unit. Recent renal fissure, pain  decreasing. Continue current management for renal fissure.      Electronically signed by: Lorenza Sanchez MD

## 2021-06-22 NOTE — PROGRESS NOTES
[facility]    Facility:   [facility] [[id number]]   Code Status: POLST AVAILABLE      Reassessment of 78-year-old male initially admitted to hospital with rhabdomyolysis, had fallen on multiple occasions in home setting, unable to upright self from floor for several days, found by a friend, treated for rhabdomyolysis, post hospitalization for rehydration transferred to TCU for rehabilitation, readmitted to hospital with escalating confusion, found to have cerebellar mass and hydrocephalus, 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    Review of systems: chief complaint is of rectal discomfort, previously with constipation resolved, experiencing loose stools intermittently. Bowl movements caused extreme rectal discomfort. Believes stools are to loose. Unwilling to get out of bed. States he believes he should be in bed as he is tired and requires rest. Denies abdominal pain. No focal neurologic deficits. Aware of ongoing mild confusion, denies focal neurologic symptoms or seizure activity. Remainder of 12 point review of systems obtained negative.    Exam: vital signs reviewed over past four days. Lying supine in bed, cooperative, oriented times two, in no apparent distress. Tangential in discussion in presentation. No facial asymmetry. Mucous membranes moist. No nuchal rigidity. No hand drift or tremor. S1 and S2 regular. Pulmonary exam without rales rhonchi or wheezes. Abdomen without distention, hyperactive bowl sounds. Periphery without edema. DJD changes knees and digits without acute inflammatory change. Strength and muscle tone diminished and symmetrical lower extremities.    Impression and plan:   metastatic prostate  cancer to bone, no pain related to bone metastases.   Status post resection of cerebellar non-small cell cancer, thought to be variant of prostate cancer metastatic, hydrocephalus post drainage, cognition slightly improved, no acute encephalopathic symptoms present.   Highly symptomatic pain from anal fissure, begin lidocaine with nitroglycerin topical gel to anal region Q day, reviewed with pharmacy by nursing staff.   Previous constipation resolved, loose stooling, decrease senna S to two tablets Q day from four tablets Q day, continue Metamucil and MiraLAX. No evidence of acute abdomen.   Dementia, will require memory care placement.   Hypertension with adequate control of blood pressure.   Multiple concerns reviewed with patient and with nursing staff.    Electronically signed by: Lorenza Sanchez MD

## 2021-06-23 NOTE — PROGRESS NOTES
[facility]    Facility:   [facility] [[id number]]   Code Status: POLST AVAILABLE    Reassessment of 78-year-old male initially admitted to hospital after falling, unable to upright self for greater than 24 hours, treated for rhabdomyolysis, transferred to TCU, readmitted to hospital with progressive encephalopathy, underwent resection of metastatic lesion cerebellar and drain of hydrocephalus. Continues in TCU for rehabilitation.    Review of systems: to begin whole brain radiation. Concerned regarding potential side effects. Tender left great toe tip, has seen podiatrist, toenails have been trimmed. Denies chest pain or palpitations. Aware of mild memory deficit, cognition slowly improving. No chest pain or palpitations. No focal neurologic deficits. Remainder of 12 point review of systems obtained negative.    Exam: vital signs reviewed over past 72 hours. Alert, oriented, conversant, lying supine in bed. No facial asymmetry. No pharyngeal erythema. No cervical adenopathy. Extraocular movements intact. S1 and S2 regular. Pulmonary exam without rales rhonchi or wheezes. Abdomen without masses or tenderness. Trace venous stasis changes bilateral lower extremities. Tip of left toe is bandaged and not observed directly.  DJD changes multiple joints without acute inflammatory change or synovitis.    Impression and plan:   prostate cancer metastatic to bone, no pain.   Recent resection metastatic brain lesion, to begin whole brain radiation for metastatic lesions.   Mild memory deficit without behavioral abnormalities.   Hypertension with satisfactory control of blood pressure.   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   Deconditioning, continued need for rehabilitation, patient spending majority of time in bed.   Medications and multiple concerns are reviewed, discussion with friend in attendance regarding status per patient request.      Electronically  signed by: Lorenza Sanchez MD

## 2021-06-23 NOTE — PROGRESS NOTES
[facility]    Facility:   [facility] [[id number]]   Code Status: POLST AVAILABLE    Reevaluation of 78-year-old male with hypertension, prostate cancer metastatic to bone, initial admission to TCU following rhabdomyolysis, had fallen at home and unable to upright self, readmitted to hospital with increasing encephalopathy, underwent resection of cerebellar mass and drain of hydrocephalus, continues in TCU for rehabilitation and management of medical problems.    Review of systems: will be given radiation therapy in one week. Aware of improvement in cognition. Concerned regarding potential side effects of radiation to brain. Denies back pain or knee discomfort. Generalized fatigue is present. No cardiac or pulmonary symptoms. Concerned regarding fungal toenails, Silvadene applied to left toe tip with pressure area. Requests appointment with personal podiatrist, order written. Previous rectal pain secondary to anal fissure resolving. No active constipation. Remainder of 12 point review of systems obtained negative.    Exam: alert, oriented, minimal evidence of memory deficit. Respiratory 18, blood pressure 115/71, pulse 60, 02 95% on room air. No facial asymmetry. Extraocular movements intact. No HJR. Mucous membranes moist. S1 and S2 regular. Pulmonary exam without rales rhonchi or wheezes. Abdomen without masses tenderness organomegaly. -1 strength bilateral feet, questionable early foot drop. No calf tenderness or swelling.    Impression and plan:   hypertension, adequate control of blood pressure, relatively low blood pressure recordings without orthostatic changes.   Mild cognitive impairment, continued improvement in cognition.   Remaining brain lesions, to begin radiation therapy in one week. Reviewed potential short and long-term consequences of whole brain radiation.   Metastatic prostate cancer to bone, followed by urology, on hormonal management.   Recent  encephalopathy resolving.   Profound weakness and deconditioning,   Limited activity in physical therapy, continue attempts at rehabilitation.   Anal fissure, pain gradually resolving. Multiple concerns are reviewed.      Electronically signed by: Lorenza Sanchez MD

## 2021-06-23 NOTE — PROGRESS NOTES
[facility]    Facility:   [facility] [[id number]]   Code Status: POLST AVAILABLE  Reassessment of 78-year-old male with metastatic prostate cancer, recently found down at home, admitted to hospital with rhabdomyolysis, transferred to TCU post stabilization, progressive encephalopathy, readmitted to hospital with findings of cerebellar mass, brain metastases, underwent cerebellar mass resection, variant of prostate cancer/non-small cell cancer, no other primary identified, transferred back to TCU for ongoing rehabilitation and management of medical problems. To begin whole brain radiation therapy over next seven days.    Review of systems: chief concern is regarding fungal toenails, has seen podiatrist in past, previous order made for podiatry appointment, patient desires to make his own appointment. Rubbing lesion left great toe minimally painful. Continued improvement in cognitive status. Aware of mild memory deficit. No headache or focal neurologic deficits. No seizure activity. In bed majority of time, weakness present, denies chest pain or palpitations. No active joint discomfort. No current urinary symptoms. Remainder of 12 point review of systems obtained negative.    Exam: vital signs reviewed over past 72 hours. Alert, mildly vague in presentation, oriented in entirety. Highly conversant. No facial asymmetry. Extraocular movements intact. No carotid bruits or HJR. S1 and S2 regular. Pulmonary exam without rales rhonchi or wheezes. Abdomen without masses tenderness organomegaly. Periphery with negligible edema, venous stasis changes present. DJD changes multiple joints, no acute inflammatory change. No hand drift. Strength symmetrical upper extremities, strength minus one bilateral lower extremities.    Impression and plan:   metastatic prostate cancer, no active back pain, majority of metastases involving sacrum and lumbar spine, on chemotherapy per  urology.   Recent cerebellar mass resection, hydrocephalus resolved, mild memory deficit, cognition overall improving, recent encephalopathy resolved, to begin radiation therapy whole brain in seven days, concerns regarding radiation therapy reviewed.   Hypertension, satisfactory control of blood pressure, intermittent low blood pressure recordings, no orthostatic symptoms, continue to observe.   Deconditioning, encourage patient to be up and out of bed on a regular basis.   Multiple concerns are reviewed, discussion with nursing staff.        Electronically signed by: Lorenza Sanchez MD

## 2021-06-23 NOTE — PROGRESS NOTES
"[facility]      Facility:    [facility] [[id number]]  Code Status: DNR and POLST AVAILABLE      Chief Complaint/Reason for Visit:  Chief Complaint   Patient presents with     Problem Visit     constipation       HPI:   Maximo \"Avel\" Merline is a 78 y.o. male who presents for admission to [facility] following a hospitalization for a fall where he was found down at his house for approximately two days. According to admission records from the hospital and per patient, he fell last Wednesday Oct 31st, and was unable to get himself up. He did not hit his head when he fell.  He crawled along like an \"inchworm\" until a physical therapist came to check on him after he missed an appt. He was brought to the hospital via ambulance. His was hospitalized from 11/2/18 - 11/5/18. 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    Today, Avel is requesting evaluation for his current bowel regimen. He states that he usually has constipation but has " been doing well and thinks his medications can be decreased. He is currently taking senna and miralax. He does enjoy drinking the prune juice and feels that may be enough to get him to go to the bathroom. Gene agrees that we could try as needed medications at this point. He denies fevers/chills, cough or cold symptoms, vision changes, chest pain/pressure, difficulty breathing, SOB, nausea, vomiting, diarrhea, dysuria, increasing weakness, increasing pain.     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)      Nuclear sclerosis      Obstructive hydrocephalus      Osteoarthritis     right and left glenohumeral joint     Primary osteoarthritis of both shoulders      Prostate cancer (H) 07/18/2017    metastatic to bone     Raynaud disease      Rhabdomyolysis      Rotator cuff tear     complete right shoulder     Senile cataract      Single vessel coronary artery disease      Thyroiditis      Vitreous degeneration      Vitreous opacities            Surgical History:  Past Surgical History:   Procedure Laterality Date     CYST REMOVAL  1983    foot      INGUINAL HERNIA REPAIR  07/30/2007     OTHER SURGICAL HISTORY  11/23/2018    Placement of a right frontal external ventricular drain     POSTERIOR FOSSA DECOMPRESSION  11/26/2018    craniectomy and resection of tumor     REVERSE TOTAL SHOULDER ARTHROPLASTY  04/25/2018     supraglottic-LMA  04/25/2018      TONSILLECTOMY         Family History:   Family History   Problem Relation Age of Onset     Hypertension Mother      Coronary artery disease Father      Hypertension Father      Coronary artery disease Brother      Hypertension Brother        Social History:    Social History     Socioeconomic History     Marital status: Single     Spouse name: None     Number of children: None     Years of education: None     Highest education level: None   Social Needs     Financial resource strain: None     Food insecurity - worry: None     Food insecurity - inability: None     Transportation needs - medical: None     Transportation needs - non-medical: None   Occupational History     None   Tobacco Use     Smoking status: Never Smoker     Smokeless tobacco: Never Used   Substance and Sexual Activity     Alcohol use: Yes     Alcohol/week: 4.2 - 8.4 oz     Types: 7 - 14 Standard drinks or equivalent per week     Drug use: No     Sexual activity: Not Currently   Other Topics Concern     None   Social History Narrative     None          Review of Systems   See HPI.     Vitals:    01/28/19 2223   BP: 111/65   Pulse: 62   Resp: 18   Temp: 99  F (37.2  C)   SpO2: 91%   Weight: 177 lb 14.4 oz (80.7 kg)       Physical Exam   General appearance: alert, appears stated age and cooperative  HEENT: Head is normocephalic with normal hair distribution. No evidence of trauma. Ears: Without lesions or deformity. No acute purulent discharge. Eyes: Conjunctivae pink with no scleral icterus or erythema. Nose: Normal mucosa and septum. Oropharnyx: mmm, no lesions present.  Lungs: respirations without effort  Abdomen: soft, nontender; bowel sounds normal; no masses, no organomegaly  Extremities: extremities normal, atraumatic, no cyanosis or edema  Pulses: 2+ and symmetric  Skin: Skin color, texture, turgor normal. No rashes or lesions.    Neurologic: Grossly normal   Psych: interacts well with caregivers, exhibits logical thought processes and  connections, pleasant      Medication List:  Current Outpatient Medications   Medication Sig     acetaminophen (TYLENOL) 500 MG tablet Take 1,000 mg by mouth 3 (three) times a day (max 4gm acetaminophen in 24/hr).           atenolol (TENORMIN) 25 MG tablet Take 25 mg by mouth daily .           atorvastatin (LIPITOR) 80 MG tablet Take 80 mg by mouth at bedtime .           bicalutamide (CASODEX) 50 MG tablet Take 50 mg by mouth daily.     lansoprazole (PREVACID) 30 MG capsule Take 30 mg by mouth 2 (two) times a day.     levothyroxine (SYNTHROID, LEVOTHROID) 150 MCG tablet Take 150 mcg by mouth daily on empty stomach.           losartan (COZAAR) 25 MG tablet Take 25 mg by mouth daily , hold if SBP <100.           melatonin 5 mg Tab tablet Take 5 mg by mouth at bedtime for insomnia.           multivitamin therapeutic tablet Take 1 tablet by mouth daily.     NIFEdipine (PROCARDIA XL) 30 MG 24 hr tablet Take 30 mg by mouth daily . Take before meal..           oxyCODONE (ROXICODONE) 5 MG immediate release tablet Take 5 mg by mouth every 8 (eight) hours as needed for pain (pain level 8-10).     polyethylene glycol (MIRALAX) 17 gram packet Take 17 g by mouth daily as needed.     senna-docusate (PERICOLACE) 8.6-50 mg tablet Take 2 tablets by mouth 2 (two) times a day as needed.        Labs:  No labs today    Assessment:    ICD-10-CM    1. Drug-induced constipation K59.03        Plan:  Constipation  -Miralax 17g mixed in 8 ounces of juice or water daily as needed.   -Prune juice 8 ounces daily.   -Senna 1 tab by mouth daily as needed.   -Encouraged patient to increase fiber in diet, eat a lot of fruits and vegetables, increase water intake.  -Exercise as much as possible.   -Follow up as needed.     Otherwise continue current care plan for all other chronic medical conditions, as they are stable. Encouraged patient to engage in healthy lifestyle behaviors such as engaging in social activities, exercising (PT/OT), eating well,  and following care plan. Follow up for routine check-up, or sooner if needed. Will continue to monitor patient and work with nursing staff collaboratively to work toward positive patient outcomes.    Electronically signed by: Amberly Bynum CNP

## 2021-06-23 NOTE — PROGRESS NOTES
"[facility]      Facility:    [facility] [[id number]]  Code Status: DNR and POLST AVAILABLE      Chief Complaint/Reason for Visit:  Chief Complaint   Patient presents with     Problem Visit     hemorrhoids       HPI:   Maximo \"Avel\" Merline is a 78 y.o. male who presents for admission to [facility] following a hospitalization for a fall where he was found down at his house for approximately two days. According to admission records from the hospital and per patient, he fell last Wednesday Oct 31st, and was unable to get himself up. He did not hit his head when he fell.  He crawled along like an \"inchworm\" until a physical therapist came to check on him after he missed an appt. He was brought to the hospital via ambulance. His was hospitalized from 11/2/18 - 11/5/18. 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    Today, Avel is requesting evaluation for hemorrhoids. He was currently taking several medications to address open areas and " hemorrhoids to his rectum, however he reports that he does not have any of these problems anymore. Gene is requesting that the medication be discontinued. He continues to have good bowel movements per his report and is not straining. He denies fevers/chills, cough or cold symptoms, vision changes, chest pain/pressure, difficulty breathing, SOB, nausea, vomiting, diarrhea, dysuria, increasing weakness, increasing pain.     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)      Nuclear sclerosis      Obstructive hydrocephalus      Osteoarthritis     right and left glenohumeral joint     Primary osteoarthritis of both shoulders      Prostate cancer (H) 07/18/2017    metastatic to bone     Raynaud disease      Rhabdomyolysis      Rotator cuff tear     complete right shoulder     Senile cataract      Single vessel coronary artery disease      Thyroiditis      Vitreous degeneration      Vitreous opacities            Surgical History:  Past Surgical History:   Procedure Laterality Date     CYST REMOVAL  1983    foot      INGUINAL HERNIA REPAIR  07/30/2007     OTHER SURGICAL HISTORY  11/23/2018    Placement of a right frontal external ventricular drain     POSTERIOR FOSSA DECOMPRESSION  11/26/2018    craniectomy and resection of tumor     REVERSE TOTAL SHOULDER ARTHROPLASTY  04/25/2018     supraglottic-LMA  04/25/2018     TONSILLECTOMY         Family History:    Family History   Problem Relation Age of Onset     Hypertension Mother      Coronary artery disease Father      Hypertension Father      Coronary artery disease Brother      Hypertension Brother        Social History:    Social History     Socioeconomic History     Marital status: Single     Spouse name: None     Number of children: None     Years of education: None     Highest education level: None   Social Needs     Financial resource strain: None     Food insecurity - worry: None     Food insecurity - inability: None     Transportation needs - medical: None     Transportation needs - non-medical: None   Occupational History     None   Tobacco Use     Smoking status: Never Smoker     Smokeless tobacco: Never Used   Substance and Sexual Activity     Alcohol use: Yes     Alcohol/week: 4.2 - 8.4 oz     Types: 7 - 14 Standard drinks or equivalent per week     Drug use: No     Sexual activity: Not Currently   Other Topics Concern     None   Social History Narrative     None          Review of Systems   See HPI.     Vitals:    01/25/19 1813   BP: 111/65   Pulse: 62   Resp: 18   Temp: 99  F (37.2  C)   SpO2: 91%   Weight: 177 lb 14.4 oz (80.7 kg)       Physical Exam   General appearance: alert, appears stated age and cooperative  HEENT: Head is normocephalic with normal hair distribution. No evidence of trauma. Ears: Without lesions or deformity. No acute purulent discharge. Eyes: Conjunctivae pink with no scleral icterus or erythema. Nose: Normal mucosa and septum. Oropharnyx: mmm, no lesions present.  Lungs: respirations without effort  Abdomen: soft, nontender; bowel sounds normal; no masses, no organomegaly  Extremities: extremities normal, atraumatic, no cyanosis or edema  Pulses: 2+ and symmetric  Skin: Skin color, texture, turgor normal. No rashes or lesions.    Neurologic: Grossly normal   Psych: interacts well with caregivers, exhibits logical thought processes and connections, pleasant      Medication  List:  Current Outpatient Medications   Medication Sig     acetaminophen (TYLENOL) 500 MG tablet Take 1,000 mg by mouth 3 (three) times a day (max 4gm acetaminophen in 24/hr).           atenolol (TENORMIN) 25 MG tablet Take 25 mg by mouth daily .           atorvastatin (LIPITOR) 80 MG tablet Take 80 mg by mouth at bedtime .           bicalutamide (CASODEX) 50 MG tablet Take 50 mg by mouth daily.     lansoprazole (PREVACID) 30 MG capsule Take 30 mg by mouth 2 (two) times a day.     levothyroxine (SYNTHROID, LEVOTHROID) 150 MCG tablet Take 150 mcg by mouth daily on empty stomach.           losartan (COZAAR) 25 MG tablet Take 25 mg by mouth daily , hold if SBP <100.           melatonin 5 mg Tab tablet Take 5 mg by mouth at bedtime for insomnia.           multivitamin therapeutic tablet Take 1 tablet by mouth daily.     NIFEdipine (PROCARDIA XL) 30 MG 24 hr tablet Take 30 mg by mouth daily . Take before meal..           oxyCODONE (ROXICODONE) 5 MG immediate release tablet Take 5 mg by mouth every 8 (eight) hours as needed for pain (pain level 8-10).     polyethylene glycol (MIRALAX) 17 gram packet Take 17 g by mouth daily as needed.     senna-docusate (PERICOLACE) 8.6-50 mg tablet Take 2 tablets by mouth 2 (two) times a day as needed.        Labs:  No labs today    Assessment:    ICD-10-CM    1. Hemorrhoids, unspecified hemorrhoid type K64.9        Plan:  Hemorrhoids- Resolved  -Discontinue annusol, calmoseptine, Nitroglycerine 2% with Lidocaine.   -Continue to use stool softeners for prevention of hemorrhoids.   -Encouraged a diet high in fiber and liquids.   -Follow up as needed.     Otherwise continue current care plan for all other chronic medical conditions, as they are stable. Encouraged patient to engage in healthy lifestyle behaviors such as engaging in social activities, exercising (PT/OT), eating well, and following care plan. Follow up for routine check-up, or sooner if needed. Will continue to monitor  patient and work with nursing staff collaboratively to work toward positive patient outcomes.    Electronically signed by: Amberly Bynum CNP

## 2021-06-23 NOTE — PROGRESS NOTES
[facility]    Facility:   [facility] [[id number]]   Code Status: POLST AVAILABLE    Admission evaluation to long-term care of 78-year-old male on transfer from TCU. History is taken from previous hospital and TCU notes as well as from previous knowledge of patient. 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, recent admission to hospital with rhabdomyolysis, had been on chemotherapy, progressive weakness and multiple falls, transferred to TCU post treatment for rhabdomyolysis, readmitted to hospital with progressive encephalopathy, found to have hydrocephalus and brain metastases, biopsy of cerebellar mass  resected showed variant non-small cell adenocarcinoma, no other primary source of carcinoma found, thought potentially to be variant of prostate cancer, returned to TCU, improvement in cognition and general strength, undergoing whole brain radiation for additional brain metastases, limited mobility, profound weakness, cognitive impairment and impaired gait as well as balance prompted transfer to long-term care.    Past medical history, current medical problem list, drug allergies, current medication list, social history, family history, code status reviewed in epic.    Review of systems: denies chest pain or palpitations. Cognition stable. Tolerating whole brain radiation. No focal neurologic deficits. Unable to walk or stand independently. Previous a no pain resolved, nitroglycerin topical to rectal SD N discontinued, reports loose stooling, previous history of constipation. Long-standing DJD of multiple joints, no current knee or shoulder pain which were previously present, no pain related to bony metastases to lumbar spine and pelvis. Remainder of 12 point review of systems obtained negative.    Exam: vital signs reviewed since transfer to long-term care. Alert, oriented, mildly vague in presentation.  Lying supine in bed in no apparent distress. No facial asymmetry. Extraocular movements intact. Pharynx without erythema. Thyroid without nodularity or tenderness. No HJR or cervical adenopathy. S1 and S2 regular with 1/6 systolic murmur. Pulmonary exam without rales rhonchi or wheezes. Modest DJD changes of digital joints. No hand drift. Abdomen without hepatosplenomegaly masses or tenderness. DJD changes bilateral knees without effusion or focal tenderness. Mild venous stasis changes lower extremities. Pedal pulses minus one and symmetrical. Toe tips are not observed directly on this occasion, recent superficial abrasion to great toe, followed by podiatry.    Impression and plan:   prostate cancer metastatic to bone, no pain related to lumbar spinal and pelvic metastases. Followed by urology.   Recent resection cerebellar mass, non-small cell adenocarcinoma, variant of prostate cancer versus other metastatic lesion, additional primary not found with screening.   Additional brain metastases, undergoing radiation therapy.   Mild cognitive impairment, no behavioral abnormalities.   Recent significant encephalopathy preceeding cerebellar mass resection and drain of hydrocephalus, returning to baseline.   Hypertension on multiple antihypertensives with satisfactory control of blood pressure.   Hypothyroidism on replacement.   Gait impairment balance impairment and generalized weakness related to metastatic process and recent cerebellar mass resection. Nothing to suggest seizure activity. Followed by neurosurgery.   Single vessel coronary artery disease without indication of angina.   Recent anal fissure resolved, nitroglycerin topical to rectum has been discontinued.   Previous constipation resolved, receiving senna S one tablet Q day, other bowel preparations discontinued.   Medications reviewed, multiple concerns reviewed.    Electronically signed by: Lorenza Sanchez MD

## 2021-06-23 NOTE — PROGRESS NOTES
"[facility]      Facility:    [facility] [[id number]]  Code Status: DNR and POLST AVAILABLE      Chief Complaint/Reason for Visit:  Chief Complaint   Patient presents with     Problem Visit     Meniere's Disease       HPI:   Maximo \"Gene\" Merline is a 78 y.o. male who presents for admission to [facility] following a hospitalization for a fall where he was found down at his house for approximately two days. According to admission records from the hospital and per patient, he fell last Wednesday Oct 31st, and was unable to get himself up. He did not hit his head when he fell.  He crawled along like an \"inchworm\" until a physical therapist came to check on him after he missed an appt. He was brought to the hospital via ambulance. His was hospitalized from 11/2/18 - 11/5/18. 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    Today, Avel is requesting evaluation for medication management regarding Meniere's Disease. Avel states that he has " had Meniere's disease for many years, however he has not had any new issues with it for quite sometime. He was using HCTZ for several years to keep it at bay. He has noticed that somehow the HCTZ fell off of his medication regimen and he is requesting it back. He has a real fear that if he is not using it the Meniere's Disease will flare up and he will get cases of vertigo and fall. He is quite fearful of this and would like to restart the HCTZ. A review of notes shows that he has not been taking it while in TCU or since November. He denies fevers/chills, cough or cold symptoms, vision changes, chest pain/pressure, difficulty breathing, SOB, nausea, vomiting, diarrhea, dysuria, increasing weakness, increasing pain.     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)      Nuclear sclerosis      Obstructive hydrocephalus      Osteoarthritis     right and left glenohumeral joint     Primary osteoarthritis of both shoulders      Prostate cancer (H) 07/18/2017    metastatic to bone     Raynaud disease      Rhabdomyolysis      Rotator cuff tear     complete right shoulder     Senile cataract      Single vessel coronary artery disease      Thyroiditis      Vitreous degeneration      Vitreous opacities            Surgical History:  Past Surgical History:   Procedure Laterality Date     CYST REMOVAL  1983    foot      INGUINAL HERNIA REPAIR  07/30/2007     OTHER  SURGICAL HISTORY  11/23/2018    Placement of a right frontal external ventricular drain     POSTERIOR FOSSA DECOMPRESSION  11/26/2018    craniectomy and resection of tumor     REVERSE TOTAL SHOULDER ARTHROPLASTY  04/25/2018     supraglottic-LMA  04/25/2018     TONSILLECTOMY         Family History:   Family History   Problem Relation Age of Onset     Hypertension Mother      Coronary artery disease Father      Hypertension Father      Coronary artery disease Brother      Hypertension Brother        Social History:    Social History     Socioeconomic History     Marital status: Single     Spouse name: None     Number of children: None     Years of education: None     Highest education level: None   Social Needs     Financial resource strain: None     Food insecurity - worry: None     Food insecurity - inability: None     Transportation needs - medical: None     Transportation needs - non-medical: None   Occupational History     None   Tobacco Use     Smoking status: Never Smoker     Smokeless tobacco: Never Used   Substance and Sexual Activity     Alcohol use: Yes     Alcohol/week: 4.2 - 8.4 oz     Types: 7 - 14 Standard drinks or equivalent per week     Drug use: No     Sexual activity: Not Currently   Other Topics Concern     None   Social History Narrative     None          Review of Systems   See HPI.     Vitals:    02/04/19 1847   BP: 125/77   Pulse: 75   Resp: 18   Temp: 97.7  F (36.5  C)   SpO2: 95%   Weight: 177 lb 14.4 oz (80.7 kg)       Physical Exam   General appearance: alert, appears stated age and cooperative  HEENT: Head is normocephalic with normal hair distribution. No evidence of trauma. Ears: Without lesions or deformity. No acute purulent discharge. Eyes: Conjunctivae pink with no scleral icterus or erythema. Nose: Normal mucosa and septum. Oropharnyx: mmm, no lesions present.  Lungs: respirations without effort  Extremities: extremities normal, atraumatic, no cyanosis or edema  Pulses: 2+ and  symmetric  Skin: Skin color, texture, turgor normal. No rashes or lesions.    Neurologic: Grossly normal   Psych: interacts well with caregivers, exhibits logical thought processes and connections, pleasant      Medication List:  Current Outpatient Medications   Medication Sig     acetaminophen (TYLENOL) 500 MG tablet Take 1,000 mg by mouth 3 (three) times a day (max 4gm acetaminophen in 24/hr).           atenolol (TENORMIN) 25 MG tablet Take 25 mg by mouth daily .           atorvastatin (LIPITOR) 80 MG tablet Take 80 mg by mouth at bedtime .           bicalutamide (CASODEX) 50 MG tablet Take 50 mg by mouth daily.     lansoprazole (PREVACID) 30 MG capsule Take 30 mg by mouth 2 (two) times a day.     levothyroxine (SYNTHROID, LEVOTHROID) 150 MCG tablet Take 150 mcg by mouth daily on empty stomach.           losartan (COZAAR) 25 MG tablet Take 25 mg by mouth daily , hold if SBP <100.           melatonin 5 mg Tab tablet Take 5 mg by mouth at bedtime for insomnia.           multivitamin therapeutic tablet Take 1 tablet by mouth daily.     NIFEdipine (PROCARDIA XL) 30 MG 24 hr tablet Take 30 mg by mouth daily . Take before meal..           oxyCODONE (ROXICODONE) 5 MG immediate release tablet Take 5 mg by mouth every 8 (eight) hours as needed for pain (pain level 8-10).     polyethylene glycol (MIRALAX) 17 gram packet Take 17 g by mouth daily as needed.     senna-docusate (PERICOLACE) 8.6-50 mg tablet Take 2 tablets by mouth 2 (two) times a day as needed.        Labs:  No labs today    Assessment:    ICD-10-CM    1. Inactive Meniere's disease of both ears H81.03        Plan:  Meniere's Disease  -HCTZ 12.5 mg by mouth daily.   -Daily blood pressures and record.   -Follow up as needed.     Otherwise continue current care plan for all other chronic medical conditions, as they are stable. Encouraged patient to engage in healthy lifestyle behaviors such as engaging in social activities, exercising (PT/OT), eating well, and  following care plan. Follow up for routine check-up, or sooner if needed. Will continue to monitor patient and work with nursing staff collaboratively to work toward positive patient outcomes.    Total unit time of 25 minutes spent with patient and nursing staff with greater than 50% of this time spent on review of previous records, counseling, education, and discussion of the above care plan with nursing staff and patient.     Electronically signed by: Amberly Bynum CNP

## 2021-06-23 NOTE — PROGRESS NOTES
[facility]    Facility:   [facility] [[id number]]   Code Status: POLST AVAILABLE    Reassessment of 78-year-old male with prostate cancer metastatic to bone, recent hospitalization for rhabdomyolysis, had fallen on multiple occasions, down for greater than 24 hours, transferred to TCU post IV hydration and stabilization in hospital, readmitted to hospital with progressive encephalopathy, underwent resection of cerebellar metastatic lesion and drain of hydrocephalus, continues in TCU for rehabilitation and management of medical problems which include hypertension, hypothyroidism, mild memory deficit, significant deconditioning. Has started whole brain radiation.    Review of systems: reports he saw his urologist today. PSA is stable. No new lesions cerebral per patient report, urology report not available for review. Rubbing area left great toe minimally painful, generalized toe pain decreasing post recent toe nail trimming. Denies chest pain or palpitations. Aware of mild memory deficit, cognition is improving. No pain related to bone metastases. Continues in bed majority of time, participating in physical therapy. Remainder of 12 point review of systems obtained negative.    Exam: blood pressure 134/72, pulse 72, respiratory 16. In bed, cooperative, oriented in entirety. Highly conversant. No facial asymmetry. No cervical adenopathy. Thyroid without nodularity, finally granular texture. S1 and S2 regular with faint systolic murmur. Pulmonary exam without rales rhonchi or wheezes. Minimal soft-tissue swelling bilateral hands, hand grasp symmetrical. Lower extremity with trace negligible edema, pedal pulses minus one and symmetrical. Sensation decreased toes. Strength and muscle tone diminished and symmetrical.    Impression and plan:   prostate cancer metastatic to bone, no pain related to metastases, variant non-small cell carcinoma (metastatic variant presumed) to  cerebellum post resection with additional brain metastases, has started radiation therapy to brain, tolerating procedure.   Hypertension with adequate control of blood pressure, recent hypotension resolved, no adjustment in antihypertensives indicated.   Mild memory deficit, cognition continues to improve.   Deconditioning with ongoing need for rehabilitation.   Concerns of patient and nursing staff reviewed, medications reviewed      Electronically signed by: Lorenza Sanchez MD

## 2021-06-24 NOTE — PROGRESS NOTES
"[facility]    Facility:   [facility] [[id number]]   Code Status: DNR  PCP: Amberly Bynum CNP   Phone: [phone]   Fax: [phone]      CHIEF COMPLAINT/REASON FOR VISIT:  Chief Complaint   Patient presents with     Discharge Summary       HISTORY COURSE:  Maximo \"Gene\" Merline is a 78 y.o. male who presents [facility] to [facility] following a hospitalization for a fall where he was found down at his house for approximately two days. According to admission records from the hospital and per patient, he fell last Wednesday Oct 31st, and was unable to get himself up. He did not hit his head when he fell.  He crawled along like an \"inchworm\" until a physical therapist came to check on him after he missed an appt. He was brought to the hospital via ambulance. His was hospitalized from 11/2/18 - 11/5/18. 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    Today Mr. Rick is being seen for a review of multiple medical conditions " prior to discharge from LTC.  Avel reported that he does not have any specific concerns at this visit and is looking forward to having more independence at his future residence at [facility] on [address].  He stated that he feels comfortable managing his own medications when he transitions to assisted living and has the ultimate goal of returning home.  We discussed his bowel regimen and he would like to continue to utilize his second senna dose as needed for constipation.  His blood pressure has been well-controlled on losartan, nifedipine, atenolol, and hydrochlorothiazide with SBP 90-110s.  The patient denied lightheadedness, dizziness, breathing difficulty, chest pain, palpitations, constipation, urinary symptoms, numbness or tingling in extremities, and pain.     Past Medical History:   Diagnosis Date     Anemia      Arthritis     right shoulder region     Blockage of coronary artery of heart (H)      Calcific tendinitis     right shoulder     Cancer (H)     prostate     Chronic ischemic heart disease      Chronic pain     both shoulders     Chronic pain of both shoulders      Coronary atherosclerosis      Dermatochalasis      Gastric reflux      HLD (hyperlipidemia)      HTN (hypertension)      Hypothyroidism      IT band syndrome      Meniere's disease in remission      Metastatic small cell carcinoma involving brain with unknown primary site (H)      Nuclear sclerosis      Obstructive hydrocephalus      Osteoarthritis     right and left glenohumeral joint     Primary osteoarthritis of both shoulders      Prostate cancer (H) 07/18/2017    metastatic to bone     Raynaud disease      Rhabdomyolysis      Rotator cuff tear     complete right shoulder     Senile cataract      Single vessel coronary artery disease      Thyroiditis      Vitreous degeneration      Vitreous opacities      Past Surgical History:   Procedure Laterality Date     CYST REMOVAL  1983    foot      INGUINAL HERNIA REPAIR  07/30/2007     OTHER  SURGICAL HISTORY  11/23/2018    Placement of a right frontal external ventricular drain     POSTERIOR FOSSA DECOMPRESSION  11/26/2018    craniectomy and resection of tumor     REVERSE TOTAL SHOULDER ARTHROPLASTY  04/25/2018     supraglottic-LMA  04/25/2018     TONSILLECTOMY       Social History     Socioeconomic History     Marital status: Single     Spouse name: Not on file     Number of children: Not on file     Years of education: Not on file     Highest education level: Not on file   Occupational History     Not on file   Social Needs     Financial resource strain: Not on file     Food insecurity:     Worry: Not on file     Inability: Not on file     Transportation needs:     Medical: Not on file     Non-medical: Not on file   Tobacco Use     Smoking status: Never Smoker     Smokeless tobacco: Never Used   Substance and Sexual Activity     Alcohol use: Yes     Alcohol/week: 4.2 - 8.4 oz     Types: 7 - 14 Standard drinks or equivalent per week     Drug use: No     Sexual activity: Not Currently   Lifestyle     Physical activity:     Days per week: Not on file     Minutes per session: Not on file     Stress: Not on file   Relationships     Social connections:     Talks on phone: Not on file     Gets together: Not on file     Attends [employer] service: Not on file     Active member of club or organization: Not on file     Attends meetings of clubs or organizations: Not on file     Relationship status: Not on file     Intimate partner violence:     Fear of current or ex partner: Not on file     Emotionally abused: Not on file     Physically abused: Not on file     Forced sexual activity: Not on file   Other Topics Concern     Not on file   Social History Narrative     Not on file     PHYSICAL EXAM:   /62   Pulse 92   Temp 97.8  F (36.6  C)   Resp 18   Wt 182 lb (82.6 kg)   SpO2 95%     General Appearance:    Alert, cooperative, no distress, appears stated age   Head:    Normocephalic, without obvious  abnormality, atraumatic   Eyes:    PERRL, conjunctiva/corneas clear, both eyes        Ears:    Normal external ear canals, both ears   Nose:   Nares normal, septum midline, mucosa normal, no drainage    or sinus tenderness   Throat:   Lips, mucosa, and tongue normal; teeth and gums normal   Neck:   Supple, symmetrical, trachea midline, no adenopathy;        thyroid:  No enlargement/tenderness/nodules; no carotid    bruit or JVD   Back:     Symmetric, no curvature, ROM normal, no CVA tenderness   Lungs:     Clear to auscultation bilaterally, respirations unlabored   Chest wall:    No tenderness or deformity   Heart:    Regular rate and rhythm, S1 and S2 normal, no murmur, rub   or gallop   Abdomen:     Soft, non-tender, bowel sounds active all four quadrants,     no masses, no organomegaly   Genitalia:    Deferred at this visit.   Rectal:    Deferred at this visit.   Extremities:   Extremities normal, atraumatic, no cyanosis or edema   Pulses:   2+ and symmetric all extremities   Skin:   Skin color, texture, turgor normal, no rashes or lesions   Neurologic:   Oriented to person, place, time, and situation.  Generalized weakness.  Wheelchair bound.       MEDICATION LIST:  Current Outpatient Medications   Medication Sig     acetaminophen (TYLENOL) 500 MG tablet Take 1,000 mg by mouth 3 (three) times a day (max 4gm acetaminophen in 24/hr).           atenolol (TENORMIN) 25 MG tablet Take 25 mg by mouth daily .           atorvastatin (LIPITOR) 80 MG tablet Take 80 mg by mouth at bedtime .           bicalutamide (CASODEX) 50 MG tablet Take 50 mg by mouth daily.     hydroCHLOROthiazide (HYDRODIURIL) 12.5 MG tablet Take 12.5 mg by mouth daily.     lansoprazole (PREVACID) 30 MG capsule Take 30 mg by mouth 2 (two) times a day.     levothyroxine (SYNTHROID, LEVOTHROID) 150 MCG tablet Take 150 mcg by mouth daily on empty stomach.           losartan (COZAAR) 25 MG tablet Take 25 mg by mouth daily , hold if SBP <100.            melatonin 5 mg Tab tablet Take 6 mg by mouth at bedtime. for insomnia           multivitamin therapeutic tablet Take 1 tablet by mouth daily.     NIFEdipine (PROCARDIA XL) 30 MG 24 hr tablet Take 30 mg by mouth daily . Take before meal..           oxyCODONE (ROXICODONE) 5 MG immediate release tablet Take 5 mg by mouth every 8 (eight) hours as needed for pain (pain level 8-10).     senna-docusate (PERICOLACE) 8.6-50 mg tablet Take 1 tablet by mouth daily. May give 1 tab as needed daily in addition to scheduled daily dose.           silver sulfADIAZINE (SILVADENE, SSD) 1 % cream Apply 1 application topically daily. Apply to tip of the left great toe daily for blister.  Cover with gauze, cling, and paper tape after silvadene application to left great toe.       DISCHARGE DIAGNOSIS:    ICD-10-CM    1. Hypertension, unspecified type I10    2. Hyperlipidemia, unspecified hyperlipidemia type E78.5    3. Drug-induced constipation K59.03    4. Physical deconditioning R53.81    5. 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   -Nothing greater than this due to risk of gastric bleed.   -Atorvastatin 80 mg by mouth daily.   -Follow up as needed.     Hypertension- chronic, stable  -Encouraged cardiac diet, low sodium diet, exercise and stress reduction techniques.   -Emphasized importance of blood pressure control.   -Continue losartan, nifedipine, atenolol, and hydrochlorothiazide as prescribed.   -Follow up per routine schedule or sooner with any complaints or concerns.     Constipation- chronic, stable  -Senna 1 tab by mouth daily and 1 tad daily as needed  -Encouraged patient to increase fiber in diet, eat a lot of fruits and vegetables, increase water intake.  -Exercise as much as possible.   -Follow up as needed.     Physical deconditioning- acute, stable  -PT/OT per therapy recommendations at  discharge    Metastatic small cell carcinoma- chronic, stable  -Treatment as directed per oncologist    Case Management:  I have reviewed the facility/SNF care plan/MDS which was done 2/18/2019, including the falls risk, nutrition and pain screening. I also reviewed the current immunizations, and preventive care.. Future cancer screening is not clinically indicated secondary to age/goals of care.   Patient's desire to return to the community is present and he is returning to semi-independent living today..    Information reviewed:  Medications, vital signs, orders, and nursing notes.     Otherwise continue current care plan for all other chronic medical conditions, as they are stable. Encouraged patient to engage in healthy lifestyle behaviors such as engaging in social activities, exercising (PT/OT), eating well, and following care plan. Follow up for routine check-up, or sooner if needed. Will continue to monitor patient and work with nursing staff collaboratively to work toward positive patient outcomes.    MEDICAL EQUIPMENT NEEDS:    The patient has mobility limitation significantly impairing his ability to participate in mobility-related activities of daily living, such as toileting, feeding, dressing, grooming, and bathing in customary locations in the home. The mobility limitation cannot be sufficiently and safely resolved by use of an appropriately fitted wheelchair. The patient or caregiver is able to safely use a manual wheelchair. The patient's functional mobility deficit can be sufficiently resolved by the use of a manual wheelchair.     The patient has mobility limitations that impairs him from doing activities of daily living without use of a wheelchair to be mobile in the home.    DISCHARGE PLAN/FACE TO FACE:  I certify that services are/were furnished while this patient was under the care of a physician and that a physician or an allowed non-physician practitioner (NPP), had a face-to-face encounter  that meets the physician face-to-face encounter requirements. The encounter was in whole, or in part, related to the primary reason for home health. The patient is confined to his home and needs intermittent skilled nursing, physical therapy, speech-language pathology, or the continued need for occupational therapy. A plan of care has been established by a physician and is periodically reviewed by a physician.  Date of Face-to-Face Encounter: 2/25/19    I certify that, based on my findings, the following services are medically necessary home health services: home health aid for bathing and ADLs, skilled nursing (RN) for medication set-up and teaching, symptoms and disease process monitoring and education, PT for strengthening, balance, endurance and safety within the home, OT for strengthening, ADL needs, adaptive equipment and safety.    My clinical findings support the need for the above skilled services because: (Please write a brief narrative summary that describes what the RN, PT, SLP, or other services will be doing in the home. A list of diagnoses in this section does not meet the CMS requirements.) home health aid for bathing and ADLs, skilled nursing (RN) for medication set-up and teaching, symptoms and disease process monitoring and education, PT for strengthening, balance, endurance and safety within the home, OT for strengthening, ADL needs, adaptive equipment and safety.    This patient is homebound because: (Please write a brief narrative summary describing the functional limitations as to why this patient is homebound and specifically what makes this patient homebound.) he is a frail elderly gentleman with multiple comorbidities and recent hospitalizations making it unsafe for him to go out into the community for services.    The patient is, or has been, under my care and I have initiated the establishment of the plan of care. This patient will be followed by a physician who will periodically review  the plan of care. Schedule follow up visit with primary care provider within 7 days to reestablish care.    Total unit/floor time of 35 minutes time consisted of the following: time spent with patient, examination of patient, reviewing the record including pertinent labs and completing documentation. Coordination of care time with nursing staff and other healthcare providers 20 minutes, this time was spent on discussion/counseling on current care plan including medical management of chronic health problems and acute health problems, education pertaining to plan, and discussion of the goals of care pertaining to the current outlined plan with nursing staff and patient.      Electronically signed by: Amberly Bynum CNP    Patient evaluated by Amberly Bynum CNP in conjunction with Shelbie Al CNP, who scribed note. Amberly Bynum CNP, then edited note. Plan agreed upon by patient, float nurse practitioner and nurse practitioner.

## 2021-06-24 NOTE — PROGRESS NOTES
"[facility]      Facility:    [facility] [[id number]]  Code Status: DNR and POLST AVAILABLE      Chief Complaint/Reason for Visit:  Chief Complaint   Patient presents with     Problem Visit     medication management       HPI:   Maximo \"Gene\" Merline is a 78 y.o. male who presents for admission to [facility] following a hospitalization for a fall where he was found down at his house for approximately two days. According to admission records from the hospital and per patient, he fell last Wednesday Oct 31st, and was unable to get himself up. He did not hit his head when he fell.  He crawled along like an \"inchworm\" until a physical therapist came to check on him after he missed an appt. He was brought to the hospital via ambulance. His was hospitalized from 11/2/18 - 11/5/18. 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    Today, Avel is requesting evaluation for medication management regarding constipation and ASA use for HLD. Genes " states he was always supposed to be on ASA for his HLD. He has had his cardiologist requesting this for some time. He has had a short history of GI bleed. Avel does understand there is some risk of bleed, however he does want to start taking ASA again. In addition, Avel states his bowels have been going well and he does not want to take any medications for constipation except for one senna tablet by mouth per day. He is otherwise feeling quite well. He denies fevers/chills, cough or cold symptoms, vision changes, chest pain/pressure, difficulty breathing, SOB, nausea, vomiting, diarrhea, dysuria, increasing weakness, increasing pain.     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)      Nuclear sclerosis      Obstructive hydrocephalus      Osteoarthritis     right and left glenohumeral joint     Primary osteoarthritis of both shoulders      Prostate cancer (H) 07/18/2017    metastatic to bone     Raynaud disease      Rhabdomyolysis      Rotator cuff tear     complete right shoulder     Senile cataract      Single vessel coronary artery disease      Thyroiditis      Vitreous degeneration      Vitreous opacities            Surgical History:  Past Surgical History:   Procedure Laterality Date     CYST REMOVAL  1983    foot      INGUINAL HERNIA REPAIR  07/30/2007     OTHER SURGICAL HISTORY  11/23/2018    Placement of a right frontal external ventricular  drain     POSTERIOR FOSSA DECOMPRESSION  11/26/2018    craniectomy and resection of tumor     REVERSE TOTAL SHOULDER ARTHROPLASTY  04/25/2018     supraglottic-LMA  04/25/2018     TONSILLECTOMY         Family History:   Family History   Problem Relation Age of Onset     Hypertension Mother      Coronary artery disease Father      Hypertension Father      Coronary artery disease Brother      Hypertension Brother        Social History:    Social History     Socioeconomic History     Marital status: Single     Spouse name: None     Number of children: None     Years of education: None     Highest education level: None   Social Needs     Financial resource strain: None     Food insecurity - worry: None     Food insecurity - inability: None     Transportation needs - medical: None     Transportation needs - non-medical: None   Occupational History     None   Tobacco Use     Smoking status: Never Smoker     Smokeless tobacco: Never Used   Substance and Sexual Activity     Alcohol use: Yes     Alcohol/week: 4.2 - 8.4 oz     Types: 7 - 14 Standard drinks or equivalent per week     Drug use: No     Sexual activity: Not Currently   Other Topics Concern     None   Social History Narrative     None          Review of Systems   See HPI.     Vitals:    02/15/19 1840   BP: 124/62   Pulse: 92   Resp: 18   Temp: 97.8  F (36.6  C)   SpO2: 95%   Weight: 177 lb 14.4 oz (80.7 kg)       Physical Exam   General appearance: alert, appears stated age and cooperative  HEENT: Head is normocephalic with normal hair distribution. No evidence of trauma. Ears: Without lesions or deformity. No acute purulent discharge. Eyes: Conjunctivae pink with no scleral icterus or erythema. Nose: Normal mucosa and septum. Oropharnyx: mmm, no lesions present.  Lungs: clear to auscultation bilaterally, respirations without effort  Heart: regular rate and rhythm, S1, S2 normal, no murmur, click, rub or gallop  Abdomen: soft, non-tender; bowel sounds normal; no  masses,  no organomegaly  Extremities: extremities normal, atraumatic, no cyanosis or edema  Pulses: 2+ and symmetric  Skin: Skin color, texture, turgor normal. No rashes or lesions  Neurologic: Grossly normal   Psych: interacts well with caregivers, exhibits logical thought processes and connections, pleasant      Medication List:  Current Outpatient Medications   Medication Sig     acetaminophen (TYLENOL) 500 MG tablet Take 1,000 mg by mouth 3 (three) times a day (max 4gm acetaminophen in 24/hr).           atenolol (TENORMIN) 25 MG tablet Take 25 mg by mouth daily .           atorvastatin (LIPITOR) 80 MG tablet Take 80 mg by mouth at bedtime .           bicalutamide (CASODEX) 50 MG tablet Take 50 mg by mouth daily.     lansoprazole (PREVACID) 30 MG capsule Take 30 mg by mouth 2 (two) times a day.     levothyroxine (SYNTHROID, LEVOTHROID) 150 MCG tablet Take 150 mcg by mouth daily on empty stomach.           losartan (COZAAR) 25 MG tablet Take 25 mg by mouth daily , hold if SBP <100.           melatonin 5 mg Tab tablet Take 5 mg by mouth at bedtime for insomnia.           multivitamin therapeutic tablet Take 1 tablet by mouth daily.     NIFEdipine (PROCARDIA XL) 30 MG 24 hr tablet Take 30 mg by mouth daily . Take before meal..           oxyCODONE (ROXICODONE) 5 MG immediate release tablet Take 5 mg by mouth every 8 (eight) hours as needed for pain (pain level 8-10).     polyethylene glycol (MIRALAX) 17 gram packet Take 17 g by mouth daily as needed.     senna-docusate (PERICOLACE) 8.6-50 mg tablet Take 2 tablets by mouth 2 (two) times a day as needed.        Labs:  No labs today    Assessment:    ICD-10-CM    1. Drug-induced constipation K59.03    2. Hyperlipidemia, unspecified hyperlipidemia type E78.5        Plan:  HLD  -ASA 81 mg by mouth daily.   -Nothing greater than this due to risk of gastric bleed.   -Atorvastatin 80 mg by mouth daily.   -Follow up as needed.     Constipation  -Hold all bowel meds, except  for senna.   -Senna 1 tab by mouth daily.   -Encouraged patient to increase fiber in diet, eat a lot of fruits and vegetables, increase water intake.  -Exercise as much as possible.   -Follow up as needed.     Otherwise continue current care plan for all other chronic medical conditions, as they are stable. Encouraged patient to engage in healthy lifestyle behaviors such as engaging in social activities, exercising (PT/OT), eating well, and following care plan. Follow up for routine check-up, or sooner if needed. Will continue to monitor patient and work with nursing staff collaboratively to work toward positive patient outcomes.    Electronically signed by: Amberly Bynum CNP